# Patient Record
Sex: FEMALE | Race: WHITE | NOT HISPANIC OR LATINO | Employment: FULL TIME | URBAN - METROPOLITAN AREA
[De-identification: names, ages, dates, MRNs, and addresses within clinical notes are randomized per-mention and may not be internally consistent; named-entity substitution may affect disease eponyms.]

---

## 2019-01-17 ENCOUNTER — OFFICE VISIT (OUTPATIENT)
Dept: URGENT CARE | Facility: CLINIC | Age: 39
End: 2019-01-17
Payer: COMMERCIAL

## 2019-01-17 VITALS
DIASTOLIC BLOOD PRESSURE: 82 MMHG | WEIGHT: 197 LBS | TEMPERATURE: 98.3 F | OXYGEN SATURATION: 95 % | HEART RATE: 96 BPM | RESPIRATION RATE: 14 BRPM | HEIGHT: 67 IN | SYSTOLIC BLOOD PRESSURE: 124 MMHG | BODY MASS INDEX: 30.92 KG/M2

## 2019-01-17 DIAGNOSIS — J06.9 VIRAL URI: ICD-10-CM

## 2019-01-17 LAB
INT CON NEG: NORMAL
INT CON POS: NORMAL
S PYO AG THROAT QL: NEGATIVE

## 2019-01-17 PROCEDURE — 87880 STREP A ASSAY W/OPTIC: CPT | Performed by: PHYSICIAN ASSISTANT

## 2019-01-17 PROCEDURE — 99214 OFFICE O/P EST MOD 30 MIN: CPT | Performed by: PHYSICIAN ASSISTANT

## 2019-01-17 RX ORDER — AZITHROMYCIN 250 MG/1
TABLET, FILM COATED ORAL
Qty: 6 TAB | Refills: 0 | Status: SHIPPED | OUTPATIENT
Start: 2019-01-17 | End: 2020-08-24

## 2019-01-17 ASSESSMENT — ENCOUNTER SYMPTOMS
CHILLS: 1
FEVER: 1
COUGH: 1
SENSORY CHANGE: 0
SORE THROAT: 1
PALPITATIONS: 0
VOMITING: 0
SWEATS: 0
HEMOPTYSIS: 0
DIARRHEA: 0
MYALGIAS: 1
FOCAL WEAKNESS: 0
HEADACHES: 1
WHEEZING: 0
TINGLING: 0
ABDOMINAL PAIN: 0
SINUS PAIN: 0
SHORTNESS OF BREATH: 1
RHINORRHEA: 1
NAUSEA: 0
SPUTUM PRODUCTION: 1

## 2019-01-17 NOTE — PROGRESS NOTES
"Subjective:      Edna Cunha is a 38 y.o. female who presents with Pharyngitis            Cough   This is a new problem. Episode onset: 1 week  The problem has been unchanged. The cough is productive of sputum (clear yellow ). Associated symptoms include chills, a fever (100F), headaches, myalgias, nasal congestion, rhinorrhea, a sore throat and shortness of breath. Pertinent negatives include no chest pain, ear congestion, ear pain, hemoptysis, postnasal drip, rash, sweats or wheezing. Treatments tried: Mucinex, Ibuprofen, cough drops. Her past medical history is significant for asthma (mild). There is no history of bronchitis or pneumonia.       Past Medical History:   Diagnosis Date   • Allergy        Past Surgical History:   Procedure Laterality Date   • PECTUS EXCAVATUM         No family history on file.  No Known Allergies  Medications, Allergies, and current problem list reviewed today in Epic      Review of Systems   Constitutional: Positive for chills, fever (100F) and malaise/fatigue.   HENT: Positive for congestion, rhinorrhea and sore throat. Negative for ear discharge, ear pain, postnasal drip and sinus pain.    Respiratory: Positive for cough, sputum production (white,clear) and shortness of breath. Negative for hemoptysis and wheezing.    Cardiovascular: Negative for chest pain, palpitations and leg swelling.   Gastrointestinal: Negative for abdominal pain, diarrhea, nausea and vomiting.   Musculoskeletal: Positive for myalgias.   Skin: Negative for rash.   Neurological: Positive for headaches. Negative for tingling, sensory change and focal weakness.     All other systems reviewed and are negative.        Objective:     /82 (BP Location: Left arm, Patient Position: Sitting, BP Cuff Size: Adult)   Pulse 96   Temp 36.8 °C (98.3 °F) (Temporal)   Resp 14   Ht 1.702 m (5' 7\")   Wt 89.4 kg (197 lb)   SpO2 95%   BMI 30.85 kg/m²      Physical Exam   Constitutional: She is oriented to " person, place, and time. She appears well-developed and well-nourished. No distress.   HENT:   Head: Normocephalic and atraumatic.   Right Ear: Tympanic membrane, external ear and ear canal normal.   Left Ear: Tympanic membrane, external ear and ear canal normal.   Nose: Mucosal edema and rhinorrhea present.   Mouth/Throat: Uvula is midline, oropharynx is clear and moist and mucous membranes are normal.   Eyes: Conjunctivae are normal.   Neck: Neck supple.   Cardiovascular: Normal rate, regular rhythm and normal heart sounds.  Exam reveals no gallop and no friction rub.    No murmur heard.  Pulmonary/Chest: Effort normal and breath sounds normal. No respiratory distress. She has no decreased breath sounds. She has no wheezes. She has no rhonchi. She has no rales.   Lymphadenopathy:     She has no cervical adenopathy.   Neurological: She is alert and oriented to person, place, and time. No cranial nerve deficit.   Skin: Skin is warm and dry. No rash noted.   Psychiatric: She has a normal mood and affect. Her behavior is normal. Judgment and thought content normal.               Assessment/Plan:     1. Viral URI  POCT Rapid Strep A    azithromycin (ZITHROMAX) 250 MG Tab       - POCT Rapid Strep A- negative    Viral etiology discussed.  Encouraged conservative treatment.  Recommend fluids, rest, humidification.    Patient was given a contingent antibiotic prescription to fill and use as directed if symptoms progressed as discussed and agreed upon.       Differential diagnoses, Supportive care, and indications for immediate follow-up discussed with patient.   Instructed to return to clinic or nearest emergency department for any change in condition, further concerns, or worsening of symptoms.    The patient demonstrated a good understanding and agreed with the treatment plan.    Trina Hernandez P.A.-C.

## 2019-01-17 NOTE — LETTER
January 17, 2019         Patient: Edna Cunha   YOB: 1980   Date of Visit: 1/17/2019           To Whom it May Concern:    Edna Cunha was seen in my clinic on 1/17/2019. She is excused from work from 1/17/19-1/18/19 for medical illness.    If you have any questions or concerns, please don't hesitate to call.        Sincerely,           Trina Hernandez P.A.-C.  Electronically Signed

## 2019-06-14 ENCOUNTER — OFFICE VISIT (OUTPATIENT)
Dept: URGENT CARE | Facility: CLINIC | Age: 39
End: 2019-06-14
Payer: COMMERCIAL

## 2019-06-14 ENCOUNTER — HOSPITAL ENCOUNTER (OUTPATIENT)
Facility: MEDICAL CENTER | Age: 39
End: 2019-06-14
Attending: EMERGENCY MEDICINE
Payer: COMMERCIAL

## 2019-06-14 VITALS
DIASTOLIC BLOOD PRESSURE: 86 MMHG | TEMPERATURE: 98.1 F | RESPIRATION RATE: 16 BRPM | WEIGHT: 199 LBS | HEIGHT: 67 IN | HEART RATE: 90 BPM | OXYGEN SATURATION: 95 % | SYSTOLIC BLOOD PRESSURE: 122 MMHG | BODY MASS INDEX: 31.23 KG/M2

## 2019-06-14 DIAGNOSIS — J98.01 BRONCHOSPASM: ICD-10-CM

## 2019-06-14 DIAGNOSIS — J02.9 SORE THROAT: ICD-10-CM

## 2019-06-14 DIAGNOSIS — J06.9 VIRAL UPPER RESPIRATORY TRACT INFECTION: ICD-10-CM

## 2019-06-14 LAB
INT CON NEG: NEGATIVE
INT CON POS: POSITIVE
S PYO AG THROAT QL: NEGATIVE

## 2019-06-14 PROCEDURE — 87070 CULTURE OTHR SPECIMN AEROBIC: CPT

## 2019-06-14 PROCEDURE — 87077 CULTURE AEROBIC IDENTIFY: CPT

## 2019-06-14 PROCEDURE — 87880 STREP A ASSAY W/OPTIC: CPT | Performed by: EMERGENCY MEDICINE

## 2019-06-14 PROCEDURE — 99214 OFFICE O/P EST MOD 30 MIN: CPT | Performed by: EMERGENCY MEDICINE

## 2019-06-14 ASSESSMENT — ENCOUNTER SYMPTOMS
NAUSEA: 0
ROS GI COMMENTS: NOTES GAGGING, VOMITING ASSOCIATED WITH COUGHING EPISODES.
SORE THROAT: 1
RHINORRHEA: 0
SHORTNESS OF BREATH: 1
DIAPHORESIS: 1
CHILLS: 1
DIARRHEA: 0
VOMITING: 1
SPUTUM PRODUCTION: 0
HEMOPTYSIS: 0
COUGH: 1
SINUS PAIN: 0
WHEEZING: 0

## 2019-06-14 NOTE — PROGRESS NOTES
Subjective:      Edna Cunha is a 39 y.o. female who presents with URI (cough,chills,clamness,congestion,sore throat,fatigue-x6 days)            URI    This is a new problem. The current episode started in the past 7 days. The problem has been unchanged. Maximum temperature: initially. Associated symptoms include congestion, coughing, a plugged ear sensation, a sore throat and vomiting. Pertinent negatives include no diarrhea, ear pain, nausea, rash, rhinorrhea, sinus pain or wheezing. She has tried sleep for the symptoms.   PMH AR, RAD, Sjogren's    Review of Systems   Constitutional: Positive for chills and diaphoresis.   HENT: Positive for congestion and sore throat. Negative for ear discharge, ear pain, hearing loss, rhinorrhea and sinus pain.    Respiratory: Positive for cough and shortness of breath. Negative for hemoptysis, sputum production and wheezing.    Gastrointestinal: Positive for vomiting. Negative for diarrhea and nausea.        Notes gagging, vomiting associated with coughing episodes.   Skin: Negative for rash.   Endo/Heme/Allergies: Positive for environmental allergies.     PMH:  has a past medical history of Allergy.  MEDS:   Current Outpatient Prescriptions:   •  Vortioxetine HBr (TRINTELLIX) 20 MG Tab, Take 20 mg by mouth 1 time daily as needed., Disp: , Rfl:   •  azithromycin (ZITHROMAX) 250 MG Tab, Take as directed on package. 1 pack., Disp: 6 Tab, Rfl: 0  •  Loratadine (CLARITIN PO), Take  by mouth., Disp: , Rfl:   •  amoxicillin-clavulanate (AUGMENTIN) 875-125 MG Tab, Take 1 Tab by mouth 2 times a day., Disp: 20 Tab, Rfl: 0  •  azithromycin (ZITHROMAX) 250 MG Tab, Use JOSSIE as directed, Disp: 6 Tab, Rfl: 0  •  cetirizine (ZYRTEC) 10 MG TABS, Take 10 mg by mouth every day., Disp: , Rfl:   •  IBUPROFEN, by Does not apply route., Disp: , Rfl:   ALLERGIES: No Known Allergies  SURGHX:   Past Surgical History:   Procedure Laterality Date   • PECTUS EXCAVATUM       SOCHX:  reports that she  "has never smoked. She has never used smokeless tobacco.  FH: family history is not on file.     Objective:     /86 (BP Location: Left arm, Patient Position: Sitting)   Pulse 90   Temp 36.7 °C (98.1 °F) (Temporal)   Resp 16   Ht 1.702 m (5' 7\")   Wt 90.3 kg (199 lb)   SpO2 95%   BMI 31.17 kg/m²      Physical Exam   Constitutional: She is oriented to person, place, and time. She appears well-developed and well-nourished. She is cooperative.  Non-toxic appearance. No distress.   HENT:   Head: Normocephalic.   Right Ear: Tympanic membrane and ear canal normal.   Left Ear: Tympanic membrane and ear canal normal.   Nose: Mucosal edema present. No rhinorrhea.   Mouth/Throat: No oropharyngeal exudate, posterior oropharyngeal edema or posterior oropharyngeal erythema. Tonsils are 1+ on the right. Tonsils are 1+ on the left. No tonsillar exudate.   Eyes: Conjunctivae and lids are normal.   Neck: Trachea normal and phonation normal. Neck supple.   Cardiovascular: Normal rate, regular rhythm and normal heart sounds.    Pulmonary/Chest: Effort normal. She has no wheezes. She has no rhonchi. She has no rales.   Lymphadenopathy:     She has no cervical adenopathy.   Neurological: She is alert and oriented to person, place, and time.   Skin: Skin is warm and dry.   Psychiatric: She has a normal mood and affect.          Advised continuing maintenance therapy for allergic rhinitis as needed.     Assessment/Plan:     1. Viral upper respiratory tract infection  Recommended supportive care measures, including rest, increasing oral fluid intake and use of over-the-counter medications for relief of symptoms.    2. Bronchospasm  Rx ProAir    3. Sore throat  negative- POCT Rapid Strep A  Throat culture sent at patient request    "

## 2019-06-16 LAB
BACTERIA SPEC RESP CULT: ABNORMAL
BACTERIA SPEC RESP CULT: ABNORMAL
SIGNIFICANT IND 70042: ABNORMAL
SITE SITE: ABNORMAL
SOURCE SOURCE: ABNORMAL

## 2019-06-17 ENCOUNTER — TELEPHONE (OUTPATIENT)
Dept: URGENT CARE | Facility: CLINIC | Age: 39
End: 2019-06-17

## 2019-06-17 DIAGNOSIS — A49.2 H. INFLUENZAE INFECTION: ICD-10-CM

## 2019-06-17 RX ORDER — AMOXICILLIN AND CLAVULANATE POTASSIUM 875; 125 MG/1; MG/1
1 TABLET, FILM COATED ORAL 2 TIMES DAILY
Qty: 14 TAB | Refills: 0 | Status: SHIPPED | OUTPATIENT
Start: 2019-06-17 | End: 2019-06-24

## 2019-06-17 NOTE — TELEPHONE ENCOUNTER
Attempted to call patient regarding lab results. Her VM is full as I was not able to leave a VM. I will try and call again later.

## 2019-06-17 NOTE — TELEPHONE ENCOUNTER
Please notify patient of positive throat culture for   Bacteria H.flu.  Can be associated with other infections, so treatment recommended if symptoms not resolved.  Rx sent to pharmacy.

## 2019-06-30 ENCOUNTER — OFFICE VISIT (OUTPATIENT)
Dept: URGENT CARE | Facility: PHYSICIAN GROUP | Age: 39
End: 2019-06-30
Payer: COMMERCIAL

## 2019-06-30 VITALS
BODY MASS INDEX: 29.55 KG/M2 | HEIGHT: 68 IN | OXYGEN SATURATION: 98 % | RESPIRATION RATE: 16 BRPM | SYSTOLIC BLOOD PRESSURE: 118 MMHG | TEMPERATURE: 97.8 F | HEART RATE: 88 BPM | DIASTOLIC BLOOD PRESSURE: 70 MMHG | WEIGHT: 195 LBS

## 2019-06-30 DIAGNOSIS — J02.9 PHARYNGITIS, UNSPECIFIED ETIOLOGY: ICD-10-CM

## 2019-06-30 DIAGNOSIS — R05.3 PERSISTENT COUGH FOR 3 WEEKS OR LONGER: Primary | ICD-10-CM

## 2019-06-30 DIAGNOSIS — Z86.19: ICD-10-CM

## 2019-06-30 PROCEDURE — 99214 OFFICE O/P EST MOD 30 MIN: CPT | Performed by: PHYSICIAN ASSISTANT

## 2019-06-30 RX ORDER — AMOXICILLIN AND CLAVULANATE POTASSIUM 875; 125 MG/1; MG/1
1 TABLET, FILM COATED ORAL 2 TIMES DAILY
Qty: 20 TAB | Refills: 0 | Status: SHIPPED | OUTPATIENT
Start: 2019-06-30 | End: 2020-08-24

## 2019-07-02 ASSESSMENT — ENCOUNTER SYMPTOMS
SWOLLEN GLANDS: 1
SORE THROAT: 1
WHEEZING: 0
SHORTNESS OF BREATH: 0
COUGH: 1
HEADACHES: 1
FEVER: 0
SPUTUM PRODUCTION: 1

## 2019-07-03 NOTE — PROGRESS NOTES
Subjective:      Edna Cunha is a 39 y.o. female who presents with Pharyngitis (tired, x 2 days )    PMH:  has a past medical history of Allergy.  MEDS:   Current Outpatient Prescriptions:   •  amoxicillin-clavulanate (AUGMENTIN) 875-125 MG Tab, Take 1 Tab by mouth 2 times a day., Disp: 20 Tab, Rfl: 0  •  Albuterol Sulfate 108 (90 Base) MCG/ACT AEROSOL POWDER, BREATH ACTIVATED, Inhale 1-2 Puffs by mouth every 6 hours as needed., Disp: 1 Each, Rfl: 0  •  Vortioxetine HBr (TRINTELLIX) 20 MG Tab, Take 20 mg by mouth 1 time daily as needed., Disp: , Rfl:   •  Loratadine (CLARITIN PO), Take  by mouth., Disp: , Rfl:   •  cetirizine (ZYRTEC) 10 MG TABS, Take 10 mg by mouth every day., Disp: , Rfl:   •  IBUPROFEN, by Does not apply route., Disp: , Rfl:   •  azithromycin (ZITHROMAX) 250 MG Tab, Take as directed on package. 1 pack. (Patient not taking: Reported on 6/30/2019), Disp: 6 Tab, Rfl: 0  •  amoxicillin-clavulanate (AUGMENTIN) 875-125 MG Tab, Take 1 Tab by mouth 2 times a day. (Patient not taking: Reported on 6/30/2019), Disp: 20 Tab, Rfl: 0  •  azithromycin (ZITHROMAX) 250 MG Tab, Use JOSSIE as directed (Patient not taking: Reported on 6/30/2019), Disp: 6 Tab, Rfl: 0  ALLERGIES: No Known Allergies  SURGHX:   Past Surgical History:   Procedure Laterality Date   • PECTUS EXCAVATUM       SOCHX:  reports that she has never smoked. She has never used smokeless tobacco.  FH: Reviewed with patient, not pertinent to this visit.           Patient presents with:  Pharyngitis: tired, x 2 days , cough x 3 weeks. Pt was given rx for augmentin which was very helpful but was only given 7 days of medication.  Pt states symptoms returned very quickly as soon as she finished her medication.  Pt feels she would benefit from more antibiotics.  She and her daughter had + throat culture.            Pharyngitis    This is a new problem. The current episode started 1 to 4 weeks ago. The problem has been gradually worsening. Neither side  "of throat is experiencing more pain than the other. The pain is at a severity of 7/10. The pain is moderate. Associated symptoms include coughing, headaches, a plugged ear sensation and swollen glands. Pertinent negatives include no congestion or shortness of breath. She has tried NSAIDs, cool liquids and gargles (abx) for the symptoms. The treatment provided moderate relief.       Review of Systems   Constitutional: Positive for malaise/fatigue. Negative for fever.   HENT: Positive for sore throat. Negative for congestion.    Respiratory: Positive for cough and sputum production. Negative for shortness of breath and wheezing.    Neurological: Positive for headaches.   All other systems reviewed and are negative.         Objective:     /70   Pulse 88   Temp 36.6 °C (97.8 °F) (Temporal)   Resp 16   Ht 1.727 m (5' 8\")   Wt 88.5 kg (195 lb)   SpO2 98%   BMI 29.65 kg/m²      Physical Exam   Constitutional: She is oriented to person, place, and time. She appears well-developed and well-nourished. No distress.   HENT:   Head: Normocephalic and atraumatic.   Right Ear: Tympanic membrane normal.   Left Ear: Tympanic membrane normal.   Nose: Nose normal.   Mouth/Throat: Posterior oropharyngeal erythema present.   Eyes: Pupils are equal, round, and reactive to light. Conjunctivae and EOM are normal.   Neck: Normal range of motion. Neck supple.   Cardiovascular: Normal rate, regular rhythm and normal heart sounds.    Pulmonary/Chest: Effort normal and breath sounds normal.   Productive cough.    Abdominal: Soft.   Musculoskeletal: Normal range of motion.   Lymphadenopathy:     She has no cervical adenopathy.   Neurological: She is alert and oriented to person, place, and time. Gait normal.   Skin: Skin is warm and dry. Capillary refill takes less than 2 seconds.   Psychiatric: She has a normal mood and affect.   Nursing note and vitals reviewed.              Assessment/Plan:     1. Persistent cough for 3 weeks or " longer  amoxicillin-clavulanate (AUGMENTIN) 875-125 MG Tab   2. Pharyngitis, unspecified etiology  amoxicillin-clavulanate (AUGMENTIN) 875-125 MG Tab   3. History of infection due to Haemophilus influenzae type B  amoxicillin-clavulanate (AUGMENTIN) 875-125 MG Tab     Will extend pt antibiotics as she was feeling much improved until her antibiotics ran out after 7 days.      PT should follow up with PCP in 1-2 days for re-evaluation if symptoms have not improved.  Discussed red flags and reasons to return to UC or ED.  Pt and/or family verbalized understanding of diagnosis and follow up instructions and was offered informational handout on diagnosis.  PT discharged.

## 2020-08-24 ENCOUNTER — PRE-ADMISSION TESTING (OUTPATIENT)
Dept: ADMISSIONS | Facility: MEDICAL CENTER | Age: 40
End: 2020-08-24
Attending: ORTHOPAEDIC SURGERY
Payer: COMMERCIAL

## 2020-08-24 DIAGNOSIS — Z01.812 PRE-OPERATIVE LABORATORY EXAMINATION: ICD-10-CM

## 2020-08-24 LAB
COVID ORDER STATUS COVID19: NORMAL
SARS-COV-2 RNA RESP QL NAA+PROBE: NOTDETECTED
SPECIMEN SOURCE: NORMAL

## 2020-08-24 PROCEDURE — U0003 INFECTIOUS AGENT DETECTION BY NUCLEIC ACID (DNA OR RNA); SEVERE ACUTE RESPIRATORY SYNDROME CORONAVIRUS 2 (SARS-COV-2) (CORONAVIRUS DISEASE [COVID-19]), AMPLIFIED PROBE TECHNIQUE, MAKING USE OF HIGH THROUGHPUT TECHNOLOGIES AS DESCRIBED BY CMS-2020-01-R: HCPCS

## 2020-08-24 RX ORDER — ESCITALOPRAM OXALATE 20 MG/1
20 TABLET ORAL
COMMUNITY
End: 2021-02-22

## 2020-08-24 RX ORDER — IBUPROFEN 200 MG
400 TABLET ORAL EVERY 6 HOURS PRN
COMMUNITY
End: 2021-02-22

## 2020-08-24 SDOH — HEALTH STABILITY: MENTAL HEALTH: HOW OFTEN DO YOU HAVE A DRINK CONTAINING ALCOHOL?: NEVER

## 2020-08-26 ENCOUNTER — ANESTHESIA EVENT (OUTPATIENT)
Dept: SURGERY | Facility: MEDICAL CENTER | Age: 40
End: 2020-08-26
Payer: COMMERCIAL

## 2020-08-26 NOTE — OR NURSING
COVID-19 Pre-surgery screenin. Do you have an undiagnosed respiratory illness or symptoms such as coughing or sneezing?  No        2. Do you have an unexplained fever greater than 100.4 degrees Fahrenheit or 38 degrees Celsius?     No    3. Have you had direct exposure to a patient who tested positive for Covid-19?    No    4. Have you had any loss of your sense of taste or smell? Have you had N/V or sore throat? No    Patient has been informed of visitor policy and asked to wear a mask upon entering the hospital   Yes

## 2020-08-27 ENCOUNTER — ANESTHESIA (OUTPATIENT)
Dept: SURGERY | Facility: MEDICAL CENTER | Age: 40
End: 2020-08-27
Payer: COMMERCIAL

## 2020-08-27 ENCOUNTER — HOSPITAL ENCOUNTER (OUTPATIENT)
Facility: MEDICAL CENTER | Age: 40
End: 2020-08-27
Attending: ORTHOPAEDIC SURGERY | Admitting: ORTHOPAEDIC SURGERY
Payer: COMMERCIAL

## 2020-08-27 VITALS
BODY MASS INDEX: 31.35 KG/M2 | OXYGEN SATURATION: 96 % | WEIGHT: 199.74 LBS | TEMPERATURE: 97.5 F | HEIGHT: 67 IN | DIASTOLIC BLOOD PRESSURE: 81 MMHG | RESPIRATION RATE: 14 BRPM | SYSTOLIC BLOOD PRESSURE: 136 MMHG | HEART RATE: 66 BPM

## 2020-08-27 LAB
HCG UR QL: NEGATIVE
PATHOLOGY CONSULT NOTE: NORMAL

## 2020-08-27 PROCEDURE — 700102 HCHG RX REV CODE 250 W/ 637 OVERRIDE(OP)

## 2020-08-27 PROCEDURE — 160025 RECOVERY II MINUTES (STATS): Performed by: ORTHOPAEDIC SURGERY

## 2020-08-27 PROCEDURE — 88304 TISSUE EXAM BY PATHOLOGIST: CPT

## 2020-08-27 PROCEDURE — 500865 HCHG NEEDLE, SPINAL 20G/22G: Performed by: ORTHOPAEDIC SURGERY

## 2020-08-27 PROCEDURE — 501838 HCHG SUTURE GENERAL: Performed by: ORTHOPAEDIC SURGERY

## 2020-08-27 PROCEDURE — 160039 HCHG SURGERY MINUTES - EA ADDL 1 MIN LEVEL 3: Performed by: ORTHOPAEDIC SURGERY

## 2020-08-27 PROCEDURE — 160002 HCHG RECOVERY MINUTES (STAT): Performed by: ORTHOPAEDIC SURGERY

## 2020-08-27 PROCEDURE — 700101 HCHG RX REV CODE 250: Performed by: ORTHOPAEDIC SURGERY

## 2020-08-27 PROCEDURE — 700111 HCHG RX REV CODE 636 W/ 250 OVERRIDE (IP): Performed by: STUDENT IN AN ORGANIZED HEALTH CARE EDUCATION/TRAINING PROGRAM

## 2020-08-27 PROCEDURE — 700101 HCHG RX REV CODE 250: Performed by: STUDENT IN AN ORGANIZED HEALTH CARE EDUCATION/TRAINING PROGRAM

## 2020-08-27 PROCEDURE — 81025 URINE PREGNANCY TEST: CPT

## 2020-08-27 PROCEDURE — 160035 HCHG PACU - 1ST 60 MINS PHASE I: Performed by: ORTHOPAEDIC SURGERY

## 2020-08-27 PROCEDURE — 160046 HCHG PACU - 1ST 60 MINS PHASE II: Performed by: ORTHOPAEDIC SURGERY

## 2020-08-27 PROCEDURE — 700111 HCHG RX REV CODE 636 W/ 250 OVERRIDE (IP): Performed by: ORTHOPAEDIC SURGERY

## 2020-08-27 PROCEDURE — 700105 HCHG RX REV CODE 258: Performed by: ORTHOPAEDIC SURGERY

## 2020-08-27 PROCEDURE — 160009 HCHG ANES TIME/MIN: Performed by: ORTHOPAEDIC SURGERY

## 2020-08-27 PROCEDURE — 160028 HCHG SURGERY MINUTES - 1ST 30 MINS LEVEL 3: Performed by: ORTHOPAEDIC SURGERY

## 2020-08-27 PROCEDURE — 160048 HCHG OR STATISTICAL LEVEL 1-5: Performed by: ORTHOPAEDIC SURGERY

## 2020-08-27 PROCEDURE — A9270 NON-COVERED ITEM OR SERVICE: HCPCS

## 2020-08-27 RX ORDER — LIDOCAINE HYDROCHLORIDE 10 MG/ML
INJECTION, SOLUTION INFILTRATION; PERINEURAL
Status: DISCONTINUED
Start: 2020-08-27 | End: 2020-08-27 | Stop reason: HOSPADM

## 2020-08-27 RX ORDER — DIPHENHYDRAMINE HYDROCHLORIDE 50 MG/ML
12.5 INJECTION INTRAMUSCULAR; INTRAVENOUS
Status: DISCONTINUED | OUTPATIENT
Start: 2020-08-27 | End: 2020-08-27 | Stop reason: HOSPADM

## 2020-08-27 RX ORDER — ONDANSETRON 2 MG/ML
4 INJECTION INTRAMUSCULAR; INTRAVENOUS
Status: DISCONTINUED | OUTPATIENT
Start: 2020-08-27 | End: 2020-08-27 | Stop reason: HOSPADM

## 2020-08-27 RX ORDER — OXYCODONE HCL 5 MG/5 ML
5 SOLUTION, ORAL ORAL
Status: DISCONTINUED | OUTPATIENT
Start: 2020-08-27 | End: 2020-08-27 | Stop reason: HOSPADM

## 2020-08-27 RX ORDER — OXYCODONE HCL 5 MG/5 ML
10 SOLUTION, ORAL ORAL
Status: DISCONTINUED | OUTPATIENT
Start: 2020-08-27 | End: 2020-08-27 | Stop reason: HOSPADM

## 2020-08-27 RX ORDER — LIDOCAINE HYDROCHLORIDE 20 MG/ML
INJECTION, SOLUTION EPIDURAL; INFILTRATION; INTRACAUDAL; PERINEURAL PRN
Status: DISCONTINUED | OUTPATIENT
Start: 2020-08-27 | End: 2020-08-27 | Stop reason: SURG

## 2020-08-27 RX ORDER — BUPIVACAINE HYDROCHLORIDE 5 MG/ML
INJECTION, SOLUTION EPIDURAL; INTRACAUDAL
Status: DISCONTINUED
Start: 2020-08-27 | End: 2020-08-27 | Stop reason: HOSPADM

## 2020-08-27 RX ORDER — METHYLPREDNISOLONE ACETATE 80 MG/ML
INJECTION, SUSPENSION INTRA-ARTICULAR; INTRALESIONAL; INTRAMUSCULAR; SOFT TISSUE
Status: DISCONTINUED
Start: 2020-08-27 | End: 2020-08-27 | Stop reason: HOSPADM

## 2020-08-27 RX ORDER — BUPIVACAINE HYDROCHLORIDE 5 MG/ML
INJECTION, SOLUTION EPIDURAL; INTRACAUDAL
Status: DISCONTINUED | OUTPATIENT
Start: 2020-08-27 | End: 2020-08-27 | Stop reason: HOSPADM

## 2020-08-27 RX ORDER — CEFAZOLIN SODIUM 1 G/3ML
INJECTION, POWDER, FOR SOLUTION INTRAMUSCULAR; INTRAVENOUS PRN
Status: DISCONTINUED | OUTPATIENT
Start: 2020-08-27 | End: 2020-08-27 | Stop reason: HOSPADM

## 2020-08-27 RX ORDER — HYDROMORPHONE HYDROCHLORIDE 1 MG/ML
0.1 INJECTION, SOLUTION INTRAMUSCULAR; INTRAVENOUS; SUBCUTANEOUS
Status: DISCONTINUED | OUTPATIENT
Start: 2020-08-27 | End: 2020-08-27 | Stop reason: HOSPADM

## 2020-08-27 RX ORDER — HALOPERIDOL 5 MG/ML
1 INJECTION INTRAMUSCULAR
Status: DISCONTINUED | OUTPATIENT
Start: 2020-08-27 | End: 2020-08-27 | Stop reason: HOSPADM

## 2020-08-27 RX ORDER — DEXAMETHASONE SODIUM PHOSPHATE 4 MG/ML
INJECTION, SOLUTION INTRA-ARTICULAR; INTRALESIONAL; INTRAMUSCULAR; INTRAVENOUS; SOFT TISSUE
Status: DISCONTINUED
Start: 2020-08-27 | End: 2020-08-27 | Stop reason: HOSPADM

## 2020-08-27 RX ORDER — LIDOCAINE HYDROCHLORIDE 20 MG/ML
JELLY TOPICAL PRN
Status: DISCONTINUED | OUTPATIENT
Start: 2020-08-27 | End: 2020-08-27 | Stop reason: SURG

## 2020-08-27 RX ORDER — SODIUM CHLORIDE, SODIUM LACTATE, POTASSIUM CHLORIDE, CALCIUM CHLORIDE 600; 310; 30; 20 MG/100ML; MG/100ML; MG/100ML; MG/100ML
INJECTION, SOLUTION INTRAVENOUS CONTINUOUS
Status: DISCONTINUED | OUTPATIENT
Start: 2020-08-27 | End: 2020-08-27 | Stop reason: HOSPADM

## 2020-08-27 RX ORDER — HYDROMORPHONE HYDROCHLORIDE 1 MG/ML
0.2 INJECTION, SOLUTION INTRAMUSCULAR; INTRAVENOUS; SUBCUTANEOUS
Status: DISCONTINUED | OUTPATIENT
Start: 2020-08-27 | End: 2020-08-27 | Stop reason: HOSPADM

## 2020-08-27 RX ORDER — ONDANSETRON 2 MG/ML
INJECTION INTRAMUSCULAR; INTRAVENOUS PRN
Status: DISCONTINUED | OUTPATIENT
Start: 2020-08-27 | End: 2020-08-27 | Stop reason: SURG

## 2020-08-27 RX ORDER — HYDROMORPHONE HYDROCHLORIDE 1 MG/ML
0.4 INJECTION, SOLUTION INTRAMUSCULAR; INTRAVENOUS; SUBCUTANEOUS
Status: DISCONTINUED | OUTPATIENT
Start: 2020-08-27 | End: 2020-08-27 | Stop reason: HOSPADM

## 2020-08-27 RX ORDER — LIDOCAINE HYDROCHLORIDE 10 MG/ML
INJECTION, SOLUTION INFILTRATION; PERINEURAL
Status: DISCONTINUED | OUTPATIENT
Start: 2020-08-27 | End: 2020-08-27 | Stop reason: HOSPADM

## 2020-08-27 RX ORDER — DEXAMETHASONE SODIUM PHOSPHATE 4 MG/ML
INJECTION, SOLUTION INTRA-ARTICULAR; INTRALESIONAL; INTRAMUSCULAR; INTRAVENOUS; SOFT TISSUE PRN
Status: DISCONTINUED | OUTPATIENT
Start: 2020-08-27 | End: 2020-08-27 | Stop reason: SURG

## 2020-08-27 RX ORDER — DEXAMETHASONE SODIUM PHOSPHATE 4 MG/ML
INJECTION, SOLUTION INTRA-ARTICULAR; INTRALESIONAL; INTRAMUSCULAR; INTRAVENOUS; SOFT TISSUE
Status: DISCONTINUED | OUTPATIENT
Start: 2020-08-27 | End: 2020-08-27 | Stop reason: HOSPADM

## 2020-08-27 RX ADMIN — POVIDONE-IODINE 15 ML: 10 SOLUTION TOPICAL at 09:00

## 2020-08-27 RX ADMIN — FENTANYL CITRATE 25 MCG: 50 INJECTION INTRAMUSCULAR; INTRAVENOUS at 10:02

## 2020-08-27 RX ADMIN — ONDANSETRON 4 MG: 2 INJECTION INTRAMUSCULAR; INTRAVENOUS at 09:59

## 2020-08-27 RX ADMIN — CEFAZOLIN 2 G: 330 INJECTION, POWDER, FOR SOLUTION INTRAMUSCULAR; INTRAVENOUS at 10:02

## 2020-08-27 RX ADMIN — PROPOFOL 200 MG: 10 INJECTION, EMULSION INTRAVENOUS at 09:59

## 2020-08-27 RX ADMIN — SODIUM CHLORIDE, POTASSIUM CHLORIDE, SODIUM LACTATE AND CALCIUM CHLORIDE: 600; 310; 30; 20 INJECTION, SOLUTION INTRAVENOUS at 09:15

## 2020-08-27 RX ADMIN — DEXAMETHASONE SODIUM PHOSPHATE 8 MG: 4 INJECTION, SOLUTION INTRA-ARTICULAR; INTRALESIONAL; INTRAMUSCULAR; INTRAVENOUS; SOFT TISSUE at 09:59

## 2020-08-27 RX ADMIN — LIDOCAINE HYDROCHLORIDE 2 ML: 20 JELLY TOPICAL at 09:59

## 2020-08-27 RX ADMIN — LIDOCAINE HYDROCHLORIDE 100 MG: 20 INJECTION, SOLUTION EPIDURAL; INFILTRATION; INTRACAUDAL at 09:59

## 2020-08-27 ASSESSMENT — PAIN SCALES - GENERAL: PAIN_LEVEL: 0

## 2020-08-27 NOTE — ANESTHESIA PROCEDURE NOTES
Airway    Date/Time: 8/27/2020 10:00 AM  Performed by: Jose Angel Welch M.D.  Authorized by: Jose Angel Welch M.D.     Location:  OR  Urgency:  Elective  Indications for Airway Management:  Anesthesia      Spontaneous Ventilation: absent    Sedation Level:  Deep  Preoxygenated: Yes    Mask Difficulty Assessment:  0 - not attempted  Final Airway Type:  Supraglottic airway  Final Supraglottic Airway:  Standard LMA    SGA Size:  3  Number of Attempts at Approach:  1

## 2020-08-27 NOTE — OR NURSING
1123 - Pt arrived to Phase II.  Report from HINA Matute.  VSS, denies pain or nausea.  Room air.     1145 - Pt stable to discharge home.  Discharge instructions gone over with  and patient.  Both verbalize understanding.  IV and armband removed, pt got dressed, has all belongings with her.  Pt taken via wheelchair down to car.

## 2020-08-27 NOTE — DISCHARGE INSTR - OTHER INFO
DR. NICOLE'S POST-OPERATIVE INSTRUCTIONS    You have just undergone a sugery by Dr. Nicole in the operating room.  It is our wish that your postoperative recovery be as quick and comfortable as possible.  Please carefully review the following items that are important for your recovery.    After any operation, a certain degree of pain is to be expected. Take Advil (ibuprofen) and Extra Strength Tylenol as first line medications for mild to moderate pain. Taking each one every 6 hours, and staggering them so that you are taking one medicine every 3 hours, is the most effective. Refer to dosing instructions on the bottle, but in general ibuprofen dose is 600-800mg and Tylenol dose is 500-1000mg. For most small procedures, this should be enough to keep you comfortable. Take these medications until your followup visit. You may have been given a small prescription for stronger pain medicine which will help relieve more severe pain.  Pain medicine may make you drowsy so please keep this in mind.  Do not drive while taking pain medicine.      When you go home, please keep your operated arm elevated at all times (above the level of your heart).  If you do this, your swelling will resolve more quickly and your pain will be improve more quickly as well. You may also place an ice pack over your dressing or splint to help with swelling and pain.    It is also very important to keep your fingers moving after most procedures, unless you had a tendon repair or fracture repair in which case you will be in a splint. Keep all of your fingers moving through a full range of motion to prevent stiffness.    For small hand procedures such as carpal tunnel release, trigger finger release, and cyst excision, the dressing that you have on your extremity should remain on for 3-4 days. It may then be removed, you can wash the incision  gently with soap and water, and keep the incision covered with a band-aid or similar clean dressing. For larger procedures or if you have a splint on, these should remain on until follow up or as specifically instructed. If you feel that your dressing is too tight during the first 3 days after surgery, you may loosen it. It is normal to see minor staining on the dressing after surgery. If there is significant bleeding, you are advised to call the office during regular office hours to have this checked.  Make sure that your dressing is kept dry at all times.  You can take a shower if you cover your arm with a plastic bag. If your dressing gets wet, replace it with sterile dressing that you can obtain from your local drug store.    Please call our office for a follow-up appointment, [phone]. Follow up after surgery is typically 10-14 days, unless you were specifically instructed otherwise. The sutures will be removed and you may be asked to see a hand therapist to optimize your functional result. Each of the hand therapists that you will be referred to have received special training in the care of the hand and upper extremity.    If you have questions regarding your surgery postop that you feel requires attention, please call the office at [phone] during business hours, or [phone] after hours for the answering service. If you feel that you have a surgical emergency postoperatively that requires immediate attention, please call the above numbers or go to the Emergency Department and ask for the Orthopedic Surgeon on call.

## 2020-08-27 NOTE — ANESTHESIA PREPROCEDURE EVALUATION
Anes H&P:  PAST MEDICAL HISTORY:   40 y.o. female who presents for Procedure(s) (LRB):  EXCISION, GANGLION CYST- WRIST (Left)  INJECTION, JOINT, DIAGNOSTIC- CORTISONE IN LEFT FIRST DORSAL COMPARTMENT.  She has current and past medical problems significant for:    Past Medical History:   Diagnosis Date   • Allergy    • Asthma    • Heart burn    • Psychiatric problem     depression, anxiety    • Urinary incontinence        SMOKING/ALCOHOL/RECREATIONAL DRUG USE:  Social History     Tobacco Use   • Smoking status: Never Smoker   • Smokeless tobacco: Never Used   Substance Use Topics   • Alcohol use: Never     Frequency: Never   • Drug use: Yes     Types: Inhaled, Oral     Comment: cannabis daily      Social History     Substance and Sexual Activity   Drug Use Yes   • Types: Inhaled, Oral    Comment: cannabis daily        PAST SURGICAL HISTORY:  Past Surgical History:   Procedure Laterality Date   • PECTUS EXCAVATUM         ALLERGIES:   No Known Allergies    MEDICATIONS:  No current facility-administered medications on file prior to encounter.      Current Outpatient Medications on File Prior to Encounter   Medication Sig Dispense Refill   • Albuterol Sulfate 108 (90 Base) MCG/ACT AEROSOL POWDER, BREATH ACTIVATED Inhale 1-2 Puffs by mouth every 6 hours as needed. 1 Each 0   • cetirizine (ZYRTEC) 10 MG TABS Take 10 mg by mouth as needed.         LABS:  Lab Results   Component Value Date/Time    HEMOGLOBIN 13.3 08/23/2008 0935    HEMATOCRIT 38.4 08/23/2008 0935    WBC 17.6 (H) 08/23/2008 0935     No results found for: SODIUM, POTASSIUM, CHLORIDE, CO2, GLUCOSE, BUN, CALCIUM    SARS-CoV2 result: Negative      PREVIOUS ANESTHETICS: See EMR  __________________________________________      Relevant Problems   No relevant active problems       Physical Exam    Airway   Mallampati: I  TM distance: >3 FB  Neck ROM: full       Cardiovascular - normal exam  Rhythm: regular  Rate: normal  (-) murmur     Dental - normal exam            Pulmonary - normal exam     Abdominal    Neurological - normal exam               Anesthesia Plan    ASA 2       Plan - general       Airway plan will be LMA        Induction: intravenous    Postoperative Plan: Postoperative administration of opioids is intended.    Pertinent diagnostic labs and testing reviewed    Informed Consent:    Anesthetic plan and risks discussed with patient.

## 2020-08-27 NOTE — ANESTHESIA POSTPROCEDURE EVALUATION
Patient: Edna Cunha    Procedure Summary     Date: 08/27/20 Room / Location: [facility] ROOM 21 / SURGERY SAME DAY [facility]    Anesthesia Start: 0954 Anesthesia Stop: 1028    Procedures:       EXCISION, GANGLION CYST- WRIST (Left Wrist)      INJECTION, JOINT, DIAGNOSTIC- CORTISONE IN LEFT FIRST DORSAL COMPARTMENT (Left Wrist) Diagnosis: (GANGLION CYST OF LEFT DORSAL WRIST)    Surgeon: Yelena Nicole M.D. Responsible Provider: Jose Angel Welch M.D.    Anesthesia Type: general ASA Status: 2          Final Anesthesia Type: general  Last vitals  BP   Blood Pressure: 105/65    Temp   36.6 °C (97.8 °F)    Pulse   Pulse: (!) 53   Resp   20    SpO2   100 %      Anesthesia Post Evaluation    Patient location during evaluation: PACU  Patient participation: complete - patient participated  Level of consciousness: awake and alert  Pain score: 0    Airway patency: patent  Anesthetic complications: no  Cardiovascular status: hemodynamically stable  Respiratory status: acceptable  Hydration status: euvolemic    PONV: none           Nurse Pain Score: 0 (NPRS)

## 2020-08-27 NOTE — OR NURSING
1027 arrived from or to pacu from Or remains asleep with oral airway in place dressing to left arm cdi good cap refill to all fingers   1034 awake oral airway removed breathing even unlabored   1047 o2 off 95%   1123 vital signs returned to pt baseline more awake alert drinking water well   Qualifies for stage 2 report given pt transferred to pacu 2

## 2020-08-27 NOTE — ANESTHESIA TIME REPORT
Anesthesia Start and Stop Event Times     Date Time Event    8/27/2020 0945 Ready for Procedure     0954 Anesthesia Start     1028 Anesthesia Stop        Responsible Staff  08/27/20    Name Role Begin End    Jose Angel Welch M.D. Anesth 0954 1028        Preop Diagnosis (Free Text):  Pre-op Diagnosis     GANGLION CYST OF LEFT DORSAL WRIST        Preop Diagnosis (Codes):    Post op Diagnosis  Ganglion cyst      Premium Reason  Non-Premium    Comments:

## 2020-08-27 NOTE — OP REPORT
OPERATIVE NOTE     DATE OF PROCEDURE: 8/27/2020            PRE-OP DIAGNOSIS: Left wrist dorsal ganglion cyst, left first dorsal compartment tenosynovitis            POST-OP DIAGNOSIS: same            PROCEDURE: Left wrist dorsal ganglion cyst excision, cortisone injection to left first dorsal compartment            SURGEON: Yelena Nicole M.D. - Primary            ANESTHESIA: General            ESTIMATED BLOOD LOSS: Minimal                   SPECIMENS: 1 specimen            COMPLICATIONS: None            CONDITION: Stable to PACU            OPERATIVE INDICATIONS AND DESCRIPTION OF PROCEDURE: This is a pleasant 40-year-old female who presented to my office with a dorsal wrist mass.  It was consistent with a dorsal wrist ganglion.  It was causing discomfort with use of the wrist and they failed conservative management.  She also had de Quervain's tenosynovitis on the same wrist.  We discussed options, both conservative and operative.  We discussed cyst excision as well as a cortisone injection to the first dorsal compartment at the same time.  Risks including, but not limited to, bleeding, infection, recurrence of the cyst, stiffness of the wrist, continued pain in the wrist, risk of anesthesia, were discussed.  They elected to proceed.  The patient was seen in the preoperative holding area, identified, and the wrist was marked.  They were taken back to the operating room.  General anesthesia was induced by the anesthesia provider.  A tourniquet was placed on the arm and the upper extremity was prepped and draped in usual orthopedic fashion.  A timeout was performed. I first injected a mixture of kenalog and 1% lidocaine into the first dorsal compartment. An additional time out was performed. The tourniquet was inflated to 250mmHg.  A 3 to 4 cm longitudinal incision was made over the dorsal wrist directly overlying the cyst.  I bluntly dissected through the subcutaneous tissues.  I encountered the ganglion cyst.  I  bluntly dissected around the cyst.  The cyst was passing through the fourth dorsal compartment. I made a small opening in the fourth compartment, retracted the extensor tendons to either side to protect them, and continued to dissect bluntly around the cyst.  I found the stalk which was tracking down towards the wrist capsule.  I opened the wrist capsule around the stalk of the mass.  It appeared to be coming from the scapholunate interval.  I amputated the stalk from its origin and sent the entire specimen to pathology. I made sure to identify and protect the SL ligament. The mass was removed in its entirety including the entire stalk and a small amount of capsule at the base of the stalk.  I thoroughly irrigated the wound with normal saline.  The skin was closed with 4-0 nylon suture.  A sterile dressing and a volar wrist splint were applied.  The patient woke from anesthesia and was transferred to PACU in stable condition.

## 2020-08-27 NOTE — DISCHARGE INSTRUCTIONS
ACTIVITY: Rest and take it easy for the first 24 hours.  A responsible adult is recommended to remain with you during that time.  It is normal to feel sleepy.  We encourage you to not do anything that requires balance, judgment or coordination.    MILD FLU-LIKE SYMPTOMS ARE NORMAL. YOU MAY EXPERIENCE GENERALIZED MUSCLE ACHES, THROAT IRRITATION, HEADACHE AND/OR SOME NAUSEA.    FOR 24 HOURS DO NOT:  Drive, operate machinery or run household appliances.  Drink beer or alcoholic beverages.   Make important decisions or sign legal documents.    SPECIAL INSTRUCTIONS: see Dr. Nicole's instructions    DIET: To avoid nausea, slowly advance diet as tolerated, avoiding spicy or greasy foods for the first day.  Add more substantial food to your diet according to your physician's instructions.  Babies can be fed formula or breast milk as soon as they are hungry.  INCREASE FLUIDS AND FIBER TO AVOID CONSTIPATION.    SURGICAL DRESSING/BATHING: see Dr. Nicole's instructions    FOLLOW-UP APPOINTMENT:  A follow-up appointment should be arranged with your doctor; call to schedule.    You should CALL YOUR PHYSICIAN if you develop:  Fever greater than 101 degrees F.  Pain not relieved by medication, or persistent nausea or vomiting.  Excessive bleeding (blood soaking through dressing) or unexpected drainage from the wound.  Extreme redness or swelling around the incision site, drainage of pus or foul smelling drainage.  Inability to urinate or empty your bladder within 8 hours.  Problems with breathing or chest pain.    You should call [phone] if you develop problems with breathing or chest pain.  If you are unable to contact your doctor or surgical center, you should go to the nearest emergency room or urgent care center.  Physician's telephone #: [phone]    If any questions arise, call your doctor.  If your doctor is not available, please feel free to call the Surgical Center at [phone].  The Center is open Monday through  Friday from 7AM to 7PM.  You can also call the HEALTH HOTLINE open 24 hours/day, 7 days/week and speak to a nurse at [phone], or toll free at [phone].    A registered nurse may call you a few days after your surgery to see how you are doing after your procedure.    MEDICATIONS: Resume taking daily medication.  Take prescribed pain medication with food.  If no medication is prescribed, you may take non-aspirin pain medication if needed.  PAIN MEDICATION CAN BE VERY CONSTIPATING.  Take a stool softener or laxative such as senokot, pericolace, or milk of magnesia if needed.    Prescription given for oxycodone.     If your physician has prescribed pain medication that includes Acetaminophen (Tylenol), do not take additional Acetaminophen (Tylenol) while taking the prescribed medication.    Depression / Suicide Risk    As you are discharged from this [facility] facility, it is important to learn how to keep safe from harming yourself.    Recognize the warning signs:  · Abrupt changes in personality, positive or negative- including increase in energy   · Giving away possessions  · Change in eating patterns- significant weight changes-  positive or negative  · Change in sleeping patterns- unable to sleep or sleeping all the time   · Unwillingness or inability to communicate  · Depression  · Unusual sadness, discouragement and loneliness  · Talk of wanting to die  · Neglect of personal appearance   · Rebelliousness- reckless behavior  · Withdrawal from people/activities they love  · Confusion- inability to concentrate     If you or a loved one observes any of these behaviors or has concerns about self-harm, here's what you can do:  · Talk about it- your feelings and reasons for harming yourself  · Remove any means that you might use to hurt yourself (examples: pills, rope, extension cords, firearm)  · Get professional help from the community (Mental Health, Substance Abuse, psychological counseling)  · Do  not be alone:Call your Safe Contact- someone whom you trust who will be there for you.  · Call your local CRISIS HOTLINE 338-6987 or 261-868-6587  · Call your local Children's Mobile Crisis Response Team [place] (294) 471-2958 or www.[employer] [place]  · Call the toll free National Suicide Prevention Hotlines   · National Suicide Prevention Lifeline 178-118-KOVO (6042)  · National [employer] Network 800-SUICIDE (188-0425)

## 2021-02-16 ENCOUNTER — HOSPITAL ENCOUNTER (OUTPATIENT)
Facility: MEDICAL CENTER | Age: 41
End: 2021-02-16
Attending: PHYSICIAN ASSISTANT
Payer: COMMERCIAL

## 2021-02-16 ENCOUNTER — OFFICE VISIT (OUTPATIENT)
Dept: URGENT CARE | Facility: CLINIC | Age: 41
End: 2021-02-16
Payer: COMMERCIAL

## 2021-02-16 ENCOUNTER — APPOINTMENT (OUTPATIENT)
Dept: RADIOLOGY | Facility: IMAGING CENTER | Age: 41
End: 2021-02-16
Attending: PHYSICIAN ASSISTANT
Payer: COMMERCIAL

## 2021-02-16 VITALS
TEMPERATURE: 98 F | SYSTOLIC BLOOD PRESSURE: 124 MMHG | DIASTOLIC BLOOD PRESSURE: 82 MMHG | RESPIRATION RATE: 18 BRPM | BODY MASS INDEX: 30.8 KG/M2 | WEIGHT: 203.2 LBS | HEIGHT: 68 IN | OXYGEN SATURATION: 92 % | HEART RATE: 105 BPM

## 2021-02-16 DIAGNOSIS — R06.03 ACUTE RESPIRATORY DISTRESS: ICD-10-CM

## 2021-02-16 DIAGNOSIS — R51.9 NONINTRACTABLE HEADACHE, UNSPECIFIED CHRONICITY PATTERN, UNSPECIFIED HEADACHE TYPE: ICD-10-CM

## 2021-02-16 LAB — D DIMER PPP IA.FEU-MCNC: 0.4 UG/ML (FEU) (ref 0–0.5)

## 2021-02-16 PROCEDURE — 99215 OFFICE O/P EST HI 40 MIN: CPT | Performed by: PHYSICIAN ASSISTANT

## 2021-02-16 PROCEDURE — 71046 X-RAY EXAM CHEST 2 VIEWS: CPT | Mod: TC | Performed by: PHYSICIAN ASSISTANT

## 2021-02-16 PROCEDURE — 85379 FIBRIN DEGRADATION QUANT: CPT

## 2021-02-16 RX ORDER — SERTRALINE HYDROCHLORIDE 100 MG/1
100 TABLET, FILM COATED ORAL DAILY
COMMUNITY

## 2021-02-16 ASSESSMENT — ENCOUNTER SYMPTOMS
SPUTUM PRODUCTION: 0
HEADACHES: 1
WHEEZING: 0
ABDOMINAL PAIN: 0
DIARRHEA: 0
HEMOPTYSIS: 0
NAUSEA: 0
SHORTNESS OF BREATH: 1
DIZZINESS: 0
SEIZURES: 0
FOCAL WEAKNESS: 0
PALPITATIONS: 0
VOMITING: 0
TINGLING: 0
COUGH: 0
SORE THROAT: 0
CHILLS: 0
LOSS OF CONSCIOUSNESS: 0
FEVER: 0

## 2021-02-17 ENCOUNTER — HOSPITAL ENCOUNTER (OUTPATIENT)
Facility: MEDICAL CENTER | Age: 41
End: 2021-02-17
Attending: NURSE PRACTITIONER
Payer: COMMERCIAL

## 2021-02-17 ENCOUNTER — OFFICE VISIT (OUTPATIENT)
Dept: URGENT CARE | Facility: CLINIC | Age: 41
End: 2021-02-17
Payer: COMMERCIAL

## 2021-02-17 VITALS
OXYGEN SATURATION: 97 % | HEART RATE: 112 BPM | RESPIRATION RATE: 18 BRPM | SYSTOLIC BLOOD PRESSURE: 132 MMHG | TEMPERATURE: 97.1 F | DIASTOLIC BLOOD PRESSURE: 80 MMHG

## 2021-02-17 DIAGNOSIS — R53.83 FATIGUE, UNSPECIFIED TYPE: ICD-10-CM

## 2021-02-17 DIAGNOSIS — R06.02 SOB (SHORTNESS OF BREATH): ICD-10-CM

## 2021-02-17 PROCEDURE — U0005 INFEC AGEN DETEC AMPLI PROBE: HCPCS

## 2021-02-17 PROCEDURE — U0003 INFECTIOUS AGENT DETECTION BY NUCLEIC ACID (DNA OR RNA); SEVERE ACUTE RESPIRATORY SYNDROME CORONAVIRUS 2 (SARS-COV-2) (CORONAVIRUS DISEASE [COVID-19]), AMPLIFIED PROBE TECHNIQUE, MAKING USE OF HIGH THROUGHPUT TECHNOLOGIES AS DESCRIBED BY CMS-2020-01-R: HCPCS

## 2021-02-17 PROCEDURE — 99214 OFFICE O/P EST MOD 30 MIN: CPT | Performed by: NURSE PRACTITIONER

## 2021-02-17 ASSESSMENT — ENCOUNTER SYMPTOMS
SORE THROAT: 0
HEADACHES: 0
SHORTNESS OF BREATH: 0
POLYDIPSIA: 0
COUGH: 0
FEVER: 0
TINGLING: 0
WEIGHT LOSS: 0
CHILLS: 0
PALPITATIONS: 0
BACK PAIN: 0
FOCAL WEAKNESS: 0
SENSORY CHANGE: 0
ORTHOPNEA: 0
MYALGIAS: 0
NAUSEA: 0
DIZZINESS: 0

## 2021-02-17 ASSESSMENT — LIFESTYLE VARIABLES: SUBSTANCE_ABUSE: 0

## 2021-02-17 NOTE — PROGRESS NOTES
Subjective:   Edna Cunha  is a 40 y.o. female who presents for Headache (x 2 days having headache, fatigue, sob )      Headache   This is a new problem. The current episode started yesterday. Pertinent negatives include no abdominal pain, coughing, dizziness, ear pain, fever, nausea, seizures, sore throat, tingling or vomiting.   Patient comes to clinic with a multitude of complaints involving recent fatigue waxing and waning over time and concerns for thyroid function as well as intermittent headaches.  She notes more concerningly over the last 2 days she has had sensation of shortness of breath.  She describes feeling a sensation of rapid pulse and need to rest well doing simple things like a sending a flight of stairs.  She denies chest pain or palpitations.  She denies history of clotting or bleeding disorders.  She denies swelling to legs.  She complains of appreciation of recent achy discomfort to bilateral calves and thighs.  She denies history of DVT or PE.  She denies cough stridor or wheeze but notes increased shortness of breath on exertion.  She denies treatments tried.  She has been attempting to establish with primary care but has had difficult time with scheduling.    Review of Systems   Constitutional: Positive for malaise/fatigue ( longer term). Negative for chills and fever.   HENT: Positive for congestion ( mild). Negative for ear pain and sore throat.    Respiratory: Positive for shortness of breath. Negative for cough, hemoptysis, sputum production and wheezing.    Cardiovascular: Negative for chest pain, palpitations and leg swelling.   Gastrointestinal: Negative for abdominal pain, diarrhea, nausea and vomiting.   Skin: Negative for rash.   Neurological: Positive for headaches. Negative for dizziness, tingling, focal weakness, seizures and loss of consciousness.       No Known Allergies     Objective:   /82 (BP Location: Left arm, Patient Position: Sitting, BP Cuff Size:  "Large adult)   Pulse (!) 105   Temp 36.7 °C (98 °F) (Temporal)   Resp 18   Ht 1.727 m (5' 8\")   Wt 92.2 kg (203 lb 3.2 oz)   SpO2 92%   BMI 30.90 kg/m²     Physical Exam  Vitals and nursing note reviewed.   Constitutional:       General: She is not in acute distress.     Appearance: She is well-developed. She is not ill-appearing, toxic-appearing or diaphoretic.   HENT:      Head: Normocephalic and atraumatic.      Right Ear: Tympanic membrane, ear canal and external ear normal.      Left Ear: Tympanic membrane, ear canal and external ear normal.      Nose: Congestion present.      Mouth/Throat:      Pharynx: Uvula midline. Posterior oropharyngeal erythema ( mild PND) present. No oropharyngeal exudate.      Tonsils: No tonsillar abscesses.   Eyes:      General: Lids are normal. No scleral icterus.        Right eye: No discharge.         Left eye: No discharge.      Conjunctiva/sclera: Conjunctivae normal.   Cardiovascular:      Rate and Rhythm: Normal rate and regular rhythm.   Pulmonary:      Effort: Pulmonary effort is normal. No respiratory distress.      Breath sounds: Normal breath sounds. No stridor. No decreased breath sounds, wheezing, rhonchi or rales.      Comments: Speaks easily in full sentences, no cough  Chest:      Chest wall: No tenderness.   Musculoskeletal:         General: Normal range of motion.      Cervical back: Neck supple.   Lymphadenopathy:      Cervical: Cervical adenopathy ( mild bilat) present.   Skin:     General: Skin is warm and dry.      Coloration: Skin is not pale.      Findings: No erythema.   Neurological:      Mental Status: She is alert and oriented to person, place, and time. She is not disoriented.   Psychiatric:         Speech: Speech normal.         Behavior: Behavior normal.     CXR -   2/16/2021 4:50 PM     HISTORY/REASON FOR EXAM:  Shortness of Breath.        TECHNIQUE/EXAM DESCRIPTION AND NUMBER OF VIEWS:  Two views of the chest.     COMPARISON:  " None.     FINDINGS:  The heart is normal in size.  No pulmonary infiltrates or consolidations are noted.  No pleural effusions are appreciated.        IMPRESSION:     No active disease.             Last Resulted: 02/16/21  5:11 PM            ECG - EKG in the office reveals a normal sinus rhythm with a rate of 71. There is no ectopy, no ST elevation, depression, no signs of ischemia or infarct.      D DIMER - NEGATIVE; call back number [phone]  Patient informed via [employer] negative D-dimer result    Assessment/Plan:   1. Acute respiratory distress  - DX-CHEST-2 VIEWS; Future  - D-DIMER; Future  - EKG  - REFERRAL TO FAMILY PRACTICE    2. Nonintractable headache, unspecified chronicity pattern, unspecified headache type  - REFERRAL TO FAMILY PRACTICE    Other orders  - sertraline (ZOLOFT) 100 MG Tab; Take 100 mg by mouth every day.  Supportive care is reviewed with patient/caregiver - recommend to push PO fluids and electrolytes, discussed negative work-up EKG chest x-ray and D-dimer, nontoxic appearance the patient-with worsening recommendation for ER evaluation  Follow-up with primary care for annual [id number] placed today  Return to clinic with lack of resolution or progression of symptoms.  ER precautions with any worsening symptoms are reviewed with patient/caregiver and they do express understanding    I have worn an N95 mask, gloves and eye protection for the entire encounter with this patient.     Differential diagnosis, natural history, supportive care, and indications for immediate follow-up discussed.    My total time spent caring for the patient on the day of the encounter was 40 minutes.   This does not include time spent on separately billable procedures/tests.

## 2021-02-17 NOTE — PROGRESS NOTES
Edna Cunha is a 40 y.o. female who presents for Fatigue (follow up ), Shortness of Breath, and Sweats      HPI this a new problem.  Edna is a 40-year-old female presents with fatigue, shortness of breath, sweats and concerned about her thyroid.  She was seen in urgent care yesterday for the same complaints.  She is concerned that she has Hashimoto's disease.  She recently had labs done by her psychiatrist but does not know the results.  She knows that her thyroid was tested.  Her psychiatrist told her that she had low vitamin D and that she should supplement.  She has been taking a multivitamin for a few days now.  Her shortness of breath occurs with exertion.  She reports that even climbing a flight of stairs makes her have to stop and rest.  She spoke with her family member who is an RN who told her that she should have her blood checked for anemia.  She is here today requesting follow-up on her thyroid and possible anemia.  She does not have a primary care provider at this time.  No other aggravating or alleviating factors.    Review of Systems   Constitutional: Positive for malaise/fatigue. Negative for chills, fever and weight loss.        Dry hair, dry skin  No change in voice   HENT: Negative for congestion and sore throat.         She believes her thyroid is enlarged   Respiratory: Negative for cough and shortness of breath.    Cardiovascular: Negative for chest pain, palpitations, orthopnea and leg swelling.   Gastrointestinal: Negative for nausea.   Musculoskeletal: Negative for back pain and myalgias.   Neurological: Negative for dizziness, tingling, sensory change, focal weakness and headaches.   Endo/Heme/Allergies: Negative for environmental allergies and polydipsia.   Psychiatric/Behavioral: Negative for substance abuse.       No Known Allergies  Past Medical History:   Diagnosis Date   • Allergy    • Asthma    • Heart burn    • Psychiatric problem     depression, anxiety    • Urinary  incontinence      Past Surgical History:   Procedure Laterality Date   • GANGLION EXCISION Left 8/27/2020    Procedure: EXCISION, GANGLION CYST- WRIST;  Surgeon: Yelena Nicole M.D.;  Location: SURGERY SAME DAY [facility];  Service: Orthopedics   • JOINT INJECTION DIAGNOSTIC Left 8/27/2020    Procedure: INJECTION, JOINT, DIAGNOSTIC- CORTISONE IN LEFT FIRST DORSAL COMPARTMENT;  Surgeon: Yelena Nicole M.D.;  Location: SURGERY SAME DAY [facility];  Service: Orthopedics   • PECTUS EXCAVATUM       History reviewed. No pertinent family history.  Social History     Tobacco Use   • Smoking status: Never Smoker   • Smokeless tobacco: Never Used   Substance Use Topics   • Alcohol use: Never     There is no problem list on file for this patient.    Current Outpatient Medications on File Prior to Visit   Medication Sig Dispense Refill   • sertraline (ZOLOFT) 100 MG Tab Take 100 mg by mouth every day.     • ibuprofen (MOTRIN) 200 MG Tab Take 400 mg by mouth every 6 hours as needed.     • escitalopram (LEXAPRO) 20 MG tablet Take 20 mg by mouth every bedtime.     • Multiple Vitamins-Minerals (MULTIVITAMIN ADULTS PO) Take 6 Tabs by mouth ONE-HALF HOUR AFTER LUNCH.     • Albuterol Sulfate 108 (90 Base) MCG/ACT AEROSOL POWDER, BREATH ACTIVATED Inhale 1-2 Puffs by mouth every 6 hours as needed. 1 Each 0   • cetirizine (ZYRTEC) 10 MG TABS Take 10 mg by mouth as needed.       No current facility-administered medications on file prior to visit.          Objective:     /80 (BP Location: Left arm, Patient Position: Sitting, BP Cuff Size: Adult)   Pulse (!) 112   Temp 36.2 °C (97.1 °F) (Temporal)   Resp 18   SpO2 97%     Physical Exam  Vitals and nursing note reviewed.   Constitutional:       General: She is not in acute distress.     Appearance: Normal appearance. She is well-developed. She is not ill-appearing or toxic-appearing.   HENT:      Head: Normocephalic.      Comments: Normal full-thickness hair.     Right Ear:  Hearing, ear canal and external ear normal. No middle ear effusion. Tympanic membrane is injected. Tympanic membrane is not erythematous.      Left Ear: Hearing, ear canal and external ear normal.  No middle ear effusion. Tympanic membrane is injected. Tympanic membrane is not erythematous.      Nose: Nose normal. No mucosal edema, congestion or rhinorrhea.      Right Sinus: No maxillary sinus tenderness or frontal sinus tenderness.      Left Sinus: No maxillary sinus tenderness or frontal sinus tenderness.      Mouth/Throat:      Mouth: Mucous membranes are moist.      Pharynx: Uvula midline. No oropharyngeal exudate or posterior oropharyngeal erythema.      Tonsils: No tonsillar abscesses.   Eyes:      Conjunctiva/sclera: Conjunctivae normal.      Pupils: Pupils are equal, round, and reactive to light.   Neck:      Thyroid: No thyroid mass, thyromegaly or thyroid tenderness.      Trachea: Trachea and phonation normal. No tracheal tenderness or tracheal deviation.   Cardiovascular:      Rate and Rhythm: Normal rate and regular rhythm.      Chest Wall: PMI is not displaced.   Pulmonary:      Effort: Pulmonary effort is normal. No respiratory distress.      Breath sounds: Normal breath sounds. No decreased breath sounds, wheezing, rhonchi or rales.   Abdominal:      Palpations: Abdomen is soft.      Tenderness: There is no abdominal tenderness.   Musculoskeletal:         General: Normal range of motion.      Cervical back: Full passive range of motion without pain, normal range of motion and neck supple. No pain with movement. Normal range of motion.   Lymphadenopathy:      Cervical: No cervical adenopathy.      Right cervical: No superficial cervical adenopathy.     Left cervical: No superficial cervical adenopathy.      Upper Body:      Right upper body: No supraclavicular adenopathy.      Left upper body: No supraclavicular adenopathy.   Skin:     General: Skin is warm and dry.      Capillary Refill: Capillary  refill takes less than 2 seconds.   Neurological:      Mental Status: She is alert and oriented to person, place, and time.      Gait: Gait normal.   Psychiatric:         Mood and Affect: Mood normal.         Speech: Speech normal.         Behavior: Behavior normal.         Thought Content: Thought content normal.         Judgment: Judgment normal.     D-dimer: Normal done yesterday  Chest x-ray: Done yesterday which was normal      Assessment /Associated Orders:      1. Fatigue, unspecified type  REFERRAL TO FOLLOW-UP WITH PRIMARY CARE    SARS-CoV-2 PCR (24 hour In-House): Collect NP swab in VTM    CBC WITH DIFFERENTIAL   2. SOB (shortness of breath)  CBC WITH DIFFERENTIAL       Medical Decision Making:    Pt is clinically stable at today's acute urgent care visit.  No acute distress noted. Appropriate for outpatient management at this time.   CBC-pending  COVID PCR testing - pending- (results to be released to [facility] if available)   Self Quarantine per CDC guidelines  Educated in infection control practices.   Discussed that this illness was most likely viral in nature. Did not see any evidence of a bacterial process.  I do not see any evidence of acute anemia or acute hypo or hyperthyroidism at this time.  She has thyroid test pending with her psychiatrist.  I do not think that is necessary to repeat those today.  Referral placed to primary care provider to establish care  Walking 1 minute SPO2 97% with a heart rate of 112.  Patient has a perceived dyspnea with exertion  Keep well hydrated  Increase rest    Advised the patient to follow-up with the primary care physician for recheck, reevaluation, and consideration of further management if necessary.   Discussed management options (risks,benefits, and alternatives to treatment). Pt expresses understanding and the treatment plan was agreed upon. Questions were encouraged and answered to pt's satisfaction.   Return to urgent care prn if new or worsening sx or if  there is no improvement in condition prn . Red flags discussed and indications to immediately call 911 or present to the Emergency Department.     I personally reviewed prior external notes and test results pertinent to today's visit.  I have independently reviewed and interpreted all diagnostics ordered during this urgent care acute visit.   Time spent evaluating this patient was at least 30 minutes and includes preparing for visit, counseling/education, exam and evaluation, obtaining history, independent interpretation, ordering lab/test/procedures and medication management.          Please note that this dictation was created using voice recognition software. I have made a reasonable attempt to correct obvious errors, but I expect that there are errors of grammar and possibly content that I did not discover before finalizing the note.    This note was electronically signed by Grecia CASTELAN, Urgent Care

## 2021-02-18 DIAGNOSIS — R53.83 FATIGUE, UNSPECIFIED TYPE: ICD-10-CM

## 2021-02-19 ENCOUNTER — TELEPHONE (OUTPATIENT)
Dept: SCHEDULING | Facility: IMAGING CENTER | Age: 41
End: 2021-02-19

## 2021-02-22 ENCOUNTER — OFFICE VISIT (OUTPATIENT)
Dept: MEDICAL GROUP | Facility: PHYSICIAN GROUP | Age: 41
End: 2021-02-22
Payer: COMMERCIAL

## 2021-02-22 VITALS
RESPIRATION RATE: 14 BRPM | HEIGHT: 68 IN | HEART RATE: 85 BPM | WEIGHT: 206 LBS | BODY MASS INDEX: 31.22 KG/M2 | TEMPERATURE: 98.2 F | SYSTOLIC BLOOD PRESSURE: 128 MMHG | OXYGEN SATURATION: 100 % | DIASTOLIC BLOOD PRESSURE: 70 MMHG

## 2021-02-22 DIAGNOSIS — F41.9 ANXIETY AND DEPRESSION: ICD-10-CM

## 2021-02-22 DIAGNOSIS — M35.00 SJOGREN'S SYNDROME, WITH UNSPECIFIED ORGAN INVOLVEMENT (HCC): ICD-10-CM

## 2021-02-22 DIAGNOSIS — J45.20 MILD INTERMITTENT ASTHMA WITHOUT COMPLICATION: ICD-10-CM

## 2021-02-22 DIAGNOSIS — E66.09 CLASS 1 OBESITY DUE TO EXCESS CALORIES WITHOUT SERIOUS COMORBIDITY WITH BODY MASS INDEX (BMI) OF 31.0 TO 31.9 IN ADULT: ICD-10-CM

## 2021-02-22 DIAGNOSIS — J30.2 SEASONAL ALLERGIES: ICD-10-CM

## 2021-02-22 DIAGNOSIS — R53.83 OTHER FATIGUE: ICD-10-CM

## 2021-02-22 DIAGNOSIS — F32.A ANXIETY AND DEPRESSION: ICD-10-CM

## 2021-02-22 DIAGNOSIS — G43.009 MIGRAINE WITHOUT AURA AND WITHOUT STATUS MIGRAINOSUS, NOT INTRACTABLE: ICD-10-CM

## 2021-02-22 DIAGNOSIS — R06.02 SHORTNESS OF BREATH: ICD-10-CM

## 2021-02-22 DIAGNOSIS — E78.49 OTHER HYPERLIPIDEMIA: ICD-10-CM

## 2021-02-22 PROBLEM — E66.811 CLASS 1 OBESITY DUE TO EXCESS CALORIES WITHOUT SERIOUS COMORBIDITY WITH BODY MASS INDEX (BMI) OF 31.0 TO 31.9 IN ADULT: Status: ACTIVE | Noted: 2021-02-22

## 2021-02-22 PROCEDURE — 99204 OFFICE O/P NEW MOD 45 MIN: CPT | Performed by: STUDENT IN AN ORGANIZED HEALTH CARE EDUCATION/TRAINING PROGRAM

## 2021-02-22 ASSESSMENT — PATIENT HEALTH QUESTIONNAIRE - PHQ9
CLINICAL INTERPRETATION OF PHQ2 SCORE: 5
SUM OF ALL RESPONSES TO PHQ QUESTIONS 1-9: 18
5. POOR APPETITE OR OVEREATING: 2 - MORE THAN HALF THE DAYS

## 2021-02-22 ASSESSMENT — ANXIETY QUESTIONNAIRES
7. FEELING AFRAID AS IF SOMETHING AWFUL MIGHT HAPPEN: MORE THAN HALF THE DAYS
5. BEING SO RESTLESS THAT IT IS HARD TO SIT STILL: SEVERAL DAYS
GAD7 TOTAL SCORE: 17
4. TROUBLE RELAXING: NEARLY EVERY DAY
2. NOT BEING ABLE TO STOP OR CONTROL WORRYING: MORE THAN HALF THE DAYS
3. WORRYING TOO MUCH ABOUT DIFFERENT THINGS: NEARLY EVERY DAY
6. BECOMING EASILY ANNOYED OR IRRITABLE: NEARLY EVERY DAY
1. FEELING NERVOUS, ANXIOUS, OR ON EDGE: NEARLY EVERY DAY

## 2021-02-22 NOTE — ASSESSMENT & PLAN NOTE
Patient is obese, with BMI of 31.  This was discussed with the patient, and she notes that it has increased recently, with all the stress she has been under, and pandemic lockdown.  Mai diet and exercise, but patient was reluctant to follow-up on planning for this, as she is currently trying to find an underlying medical reason for her symptoms, and does not wish to diet, until those are identified.

## 2021-02-22 NOTE — ASSESSMENT & PLAN NOTE
Notes unilateral (mostly right) migraines in association with the start of her menstrual cycle.  She notes she can control the pain with occasional ibuprofen.  Otherwise, usually not problematic.  ...  Accompanied by photophobia (no phonophobia). No aura or visual changes.

## 2021-02-22 NOTE — ASSESSMENT & PLAN NOTE
Patient sees Dr. Lyon, psychiatry.  And also has a therapist as well (every other week).  Psychiatry had recently ordered a number of labs, to evaluate for underlying medical conditions; however, most of these labs were in the normal ranges.  Patient notes having recently changed medications quite a few times, and states she is on her third different medication, trying to get the correct improvement.      SHIV-7 Questionnaire  Feeling nervous, anxious, or on edge: Nearly every day  Not being able to sop or control worrying: More than half the days  Worrying too much about different things: Nearly every day  Trouble relaxing: Nearly every day  Being so restless that it's hard to sit still: Several days  Becoming easily annoyed or irritable: Nearly every day  Feeling afraid as if something awful might happen: More than half the days  Total: 17  Interpretation of SHIV 7 Total Score   Score Severity :  0-4 No Anxiety   5-9 Mild Anxiety  10-14 Moderate Anxiety  15-21 Severe Anxiety    Depression Screening  Little interest or pleasure in doing things?  3 - nearly every day   Feeling down, depressed , or hopeless? 2 - more than half the days   Trouble falling or staying asleep, or sleeping too much?  2 - more than half the days   Feeling tired or having little energy?  3 - nearly every day   Poor appetite or overeating?  2 - more than half the days   Feeling bad about yourself - or that you are a failure or have let yourself or your family down? 3 - nearly every day   Trouble concentrating on things, such as reading the newspaper or watching television? 2 - more than half the days   Moving or speaking so slowly that other people could have noticed.  Or the opposite - being so fidgety or restless that you have been moving around a lot more than usual?  0 - not at all   Thoughts that you would be better off dead, or of hurting yourself?  1 - several days   Patient Health Questionnaire Score: 18   If depressive symptoms  identified deferred to follow up visit unless specifically addressed in assesment and plan.  Interpretation of PHQ-9 Total Score   Score Severity   1-4 No Depression   5-9 Mild Depression   10-14 Moderate Depression   15-19 Moderately Severe Depression   20-27 Severe Depression

## 2021-02-22 NOTE — ASSESSMENT & PLAN NOTE
Patient has outside labs, noting hyperlipidemia.  Lipids - 216, 135, 37, 154.  This was discussed with the patient, and advised on diet and exercise.  However, at this time, the patient declines either, for the time being.

## 2021-02-22 NOTE — ASSESSMENT & PLAN NOTE
Patient notes history of asthma, since childhood.  Notes its more often bothers her during allergies.  She notes only having to use albuterol once or twice a month.  And, she was never hospitalized for it.

## 2021-02-22 NOTE — ASSESSMENT & PLAN NOTE
Patient notes occasional seasonal/environmental allergies.  She uses occasional Zyrtec to control this.  She only uses it as needed.  She does not view this is a major issue currently.

## 2021-02-22 NOTE — LETTER
[facility]  Kvng Valles M.D.  [address]  [place] [address]  Fax: [phone]   Authorization for Release/Disclosure of   Protected Health Information   Name: NALLELY ADKINS : 1980 SSN: [id number]   Address: [address] [place] [address] [address]  [place] [address] Phone:    [phone] (home)    I authorize the entity listed below to release/disclose the PHI below to:   [facility]/Kvng Valles M.D. and Kvng Valles M.D.   Provider or Entity Name:   Dr. Barksdale.    Address   City, State, [facility] Phone:      Fax:     Reason for request: continuity of care   Information to be released:    [  ] LAST COLONOSCOPY,  including any PATH REPORT and follow-up  [  ] LAST FIT/COLOGUARD RESULT [  ] LAST DEXA  [  ] LAST MAMMOGRAM  [  ] LAST PAP  [  ] LAST LABS [  ] RETINA EXAM REPORT  [  ] IMMUNIZATION RECORDS  [x] Release all info (past 4 years).       [  ] Check here and initial the line next to each item to release ALL health information INCLUDING  _____ Care and treatment for drug and / or alcohol abuse  _____ HIV testing, infection status, or AIDS  _____ Genetic Testing    DATES OF SERVICE OR TIME PERIOD TO BE DISCLOSED: __last 4 years_______  I understand and acknowledge that:  * This Authorization may be revoked at any time by you in writing, except if your health information has already been used or disclosed.  * Your health information that will be used or disclosed as a result of you signing this authorization could be re-disclosed by the recipient. If this occurs, your re-disclosed health information may no longer be protected by State or Federal laws.  * You may refuse to sign this Authorization. Your refusal will not affect your ability to obtain treatment.  * This Authorization becomes effective upon signing and will  on (date) __________.      If no date is indicated, this Authorization will  one (1) year from the signature date.    Name: Nallely Dupont  Alph[place]    Signature:   Date:     2/22/2021       PLEASE FAX REQUESTED RECORDS BACK TO: [phone]

## 2021-02-22 NOTE — PROGRESS NOTES
"New to Provider (and [facility])      Reason to establish: New patient to establish  Chief Complaint   Patient presents with   • Establish Care     lab review   • Fatigue     SOB, anx, dep        HPI:   Edna Cunha is a 40 y.o. female.     Patient is here to establish, and independently reviewed some labs, that previously been ordered by her psychiatrist.  She is concerned about shortness of breath and fatigue, and focusing a great deal on potential for thyroid issues.    Other fatigue  Patient notes ongoing fatigue and feeling \"rundown\", for the past 6 months.  She has also noted some body aches, and is concerned about possible thyroid issues.  She does have history of anxiety and depression, and scored high for both of these issues on screening today.  However, she denies this is related to it.  However, she is very focused on trying to work-up the thyroid the on the regular work-up.  She also notes occasional episodes of shortness of breath (usually with increased heart rate and anxiety), but states that it is different than her \"normal level of anxiety\".    Anxiety and depression  Patient sees Dr. Lyon, psychiatry.  And also has a therapist as well (every other week).  Psychiatry had recently ordered a number of labs, to evaluate for underlying medical conditions; however, most of these labs were in the normal ranges.  Patient notes having recently changed medications quite a few times, and states she is on her third different medication, trying to get the correct improvement.      SIHV-7 Questionnaire  Feeling nervous, anxious, or on edge: Nearly every day  Not being able to sop or control worrying: More than half the days  Worrying too much about different things: Nearly every day  Trouble relaxing: Nearly every day  Being so restless that it's hard to sit still: Several days  Becoming easily annoyed or irritable: Nearly every day  Feeling afraid as if something awful might happen: " More than half the days  Total: 17  Interpretation of SHIV 7 Total Score   Score Severity :  0-4 No Anxiety   5-9 Mild Anxiety  10-14 Moderate Anxiety  15-21 Severe Anxiety    Depression Screening  Little interest or pleasure in doing things?  3 - nearly every day   Feeling down, depressed , or hopeless? 2 - more than half the days   Trouble falling or staying asleep, or sleeping too much?  2 - more than half the days   Feeling tired or having little energy?  3 - nearly every day   Poor appetite or overeating?  2 - more than half the days   Feeling bad about yourself - or that you are a failure or have let yourself or your family down? 3 - nearly every day   Trouble concentrating on things, such as reading the newspaper or watching television? 2 - more than half the days   Moving or speaking so slowly that other people could have noticed.  Or the opposite - being so fidgety or restless that you have been moving around a lot more than usual?  0 - not at all   Thoughts that you would be better off dead, or of hurting yourself?  1 - several days   Patient Health Questionnaire Score: 18   If depressive symptoms identified deferred to follow up visit unless specifically addressed in assesment and plan.  Interpretation of PHQ-9 Total Score   Score Severity   1-4 No Depression   5-9 Mild Depression   10-14 Moderate Depression   15-19 Moderately Severe Depression   20-27 Severe Depression      Mild intermittent asthma without complication  Patient notes history of asthma, since childhood.  Notes its more often bothers her during allergies.  She notes only having to use albuterol once or twice a month.  And, she was never hospitalized for it.    Seasonal allergies  Patient notes occasional seasonal/environmental allergies.  She uses occasional Zyrtec to control this.  She only uses it as needed.  She does not view this is a major issue currently.     Migraine without aura and without status migrainosus, not intractable  Notes  unilateral (mostly right) migraines in association with the start of her menstrual cycle.  She notes she can control the pain with occasional ibuprofen.  Otherwise, usually not problematic.  ...  Accompanied by photophobia (no phonophobia). No aura or visual changes.     Class 1 obesity due to excess calories without serious comorbidity with body mass index (BMI) of 31.0 to 31.9 in adult  Patient is obese, with BMI of 31.  This was discussed with the patient, and she notes that it has increased recently, with all the stress she has been under, and pandemic lockdown.  Mai diet and exercise, but patient was reluctant to follow-up on planning for this, as she is currently trying to find an underlying medical reason for her symptoms, and does not wish to diet, until those are identified.       Review of Symptoms  GEN/CONST:   Denies fever, chills, weakness      + fatigue.   + mild weight gain. (less active, and eating more).   H/E:     Denies blurry vision or eye pain.  ENT:   Denies sinus pain, sore throat, ear pain, difficulty hearing.  CARDIO:   Denies chest pain, orthopnea, or edema.       + Increased heart rate, and shortness of breath, episodically.  RESP:   Denies wheezing, or coughing.     + Increased heart rate, and shortness of breath, episodically.  GI:    Denies nausea, vomiting,  constipation, or abdominal pain.   :   Denies dysuria, hematuria,   HEME:  Denies easy bruising, bleeding   MSK:  Denies weakness,     + some general muscle aches.       NEURO:  Denies numbness, or focal weakness.      + Notes occasional sensation of feet falling asleep, more than in prior year     + Notes occasional irritation of her wrists, after typing for long periods at work.  ENDO:  Denies polyuria, or polydipsia.  PSYCH:  + see HPI.         Past Medical History:   Diagnosis Date   • Allergy    • Asthma    • Heart burn    • Psychiatric problem     depression, anxiety    • Sjogren's disease (HCC)     SSA+       Past  "Surgical History:   Procedure Laterality Date   • GANGLION EXCISION Left 8/27/2020    Procedure: EXCISION, GANGLION CYST- WRIST;  Surgeon: Yelena Nicole M.D.;  Location: SURGERY SAME DAY [facility];  Service: Orthopedics   • JOINT INJECTION DIAGNOSTIC Left 8/27/2020    Procedure: INJECTION, JOINT, DIAGNOSTIC- CORTISONE IN LEFT FIRST DORSAL COMPARTMENT;  Surgeon: Yelena Nicole M.D.;  Location: SURGERY SAME DAY [facility];  Service: Orthopedics   • PECTUS EXCAVATUM         No family history on file.    Social History     Tobacco Use   • Smoking status: Never Smoker   • Smokeless tobacco: Never Used   Substance Use Topics   • Alcohol use: Not Currently     Comment: few times per year       Current Outpatient Medications   Medication Sig Dispense Refill   • sertraline (ZOLOFT) 100 MG Tab Take 100 mg by mouth every day.     • Multiple Vitamins-Minerals (MULTIVITAMIN ADULTS PO) Take 6 Tabs by mouth ONE-HALF HOUR AFTER LUNCH.     • Albuterol Sulfate 108 (90 Base) MCG/ACT AEROSOL POWDER, BREATH ACTIVATED Inhale 1-2 Puffs by mouth every 6 hours as needed. 1 Each 0   • cetirizine (ZYRTEC) 10 MG TABS Take 10 mg by mouth as needed.       No current facility-administered medications for this visit.       Allergies as of 02/22/2021   • (No Known Allergies)         Physical Exam  Heart rate and oxygenation were repeated.  Initially, had 95% O2, with .  However, when pulse ox was repeated, and talking to the patient (as distraction), pulse ox was noted to be 100%, and heart rate normal (85).   ...  As the patient continued to talk, it appeared she was not pausing to breathe, and the pulse ox slowly decreased to 95 again.  However, once she paused her rapid speaking, and inhaled, the pulse ox again returned up to the high 90s.  ........  /70 (BP Location: Right arm, Patient Position: Sitting, BP Cuff Size: Large adult)   Pulse 85   Temp 36.8 °C (98.2 °F) (Temporal)   Resp 14   Ht 1.727 m (5' 8\")   Wt 93.4 kg " (206 lb)   LMP 02/03/2021 (Exact Date)   SpO2 100%   Breastfeeding No   BMI 31.32 kg/m²   General:  Alert and oriented, No apparent distress.     + overweight / obese.   Eyes:  PER   EOMI.  No scleral icterus.      Throat: Clear, no erythema or exudates noted.     + Mallampati score: 3.   Neck: Supple. No lymphadenopathy noted.      - Thyroid not palpable; no obvious nodules.    Lungs: Clear to auscultation bilaterally.  No wheezes or rales.  Cardiovascular: Regular rate and rhythm.  No murmurs, rubs or gallops.  Abdomen:  Soft.  Non-distended.  Non-tender.     Extremities: No pedal edema.  Good general motion of extremities.    Neurological:  Motor function intact and symmetrical.   and sensation grossly intact and symmetrical to light touch   Psychological: + Appears somewhat anxious, and hyper-focused on obtaining additional medical testing, especially for thyroid.  + Appears reluctant to discuss even the possibility of some of the symptoms possibly being related to anxiety or depression.        Labs:  Recent / Prior labs reviewed, and independently interpreted.     CBC, CMP, Mag, Lipids, TSH, fT4, Vit.D, B12, Folate  NEIL, SSA-Ab.   ... Patient brought in outside labs, there were none in our system yet.  These were independently interpreted and reviewed with patient.    Normal :  cbc, cmp, mag,  b12, folate, fT4 (1.18), TSH (2.2).  Lipids : had  high chol, and ldl (with slightly low ldl)  Vit.D : 29.8.    ... Prior / older labs, noted + NEIL (speckled pattern), 1:1280.  + SSA-Ab's (> 8.0).            Assessment & Plan    1. Other fatigue  Patient notes fatigue, for the past 6 months, and scored very high for anxiety and depression, it may simply be related to her secondary to these issues.  However, will try to work-up further, medically.  She brings in labs, that did not show any underlying issue with CBC, CMP, magnesium, TSH, or free T4.  She also notes that she has been using marijuana, daily, for about 6-9  months.  This was identified and reviewed with the patient, but currently she feels it is unlikely be the cause of her fatigue.  She notes history of snoring, and Mallampati score of 3+, as well as being fatigued and tired; therefore, will refer for sleep study.  In the setting of asthma, and possible history of Sjogren's syndrome, we will also obtain new PFTs.  (And requesting records from rheumatology).  - REFERRAL TO PULMONARY AND SLEEP MEDICINE  - PULMONARY FUNCTION TESTS -Test requested: Complete Pulmonary Function Test; Future    2. Shortness of breath  3. Mild intermittent asthma without complication  4. Sjogren's syndrome, with unspecified organ involvement (HCC)  Patient notes periodic episodes of shortness of breath versus dyspnea.  She does have a history of intermittent asthma, as well as possible Sjogren's and anxiety/depression.  In office, we are able to observe changes in heart rate and oxygenation, depending on patient being relaxed versus talking rapidly (without pausing to inhale).  Physical exam was unremarkable for heart/lung findings.  She had an EKG done, in urgent care, 1 week ago, that was noted to be normal sinus rhythm, and without significant ST changes.    In the setting of asthma, and possible history of Sjogren's syndrome, we will also obtain new PFTs.  (And requesting records from rheumatology).  - PULMONARY FUNCTION TESTS -Test requested: Complete Pulmonary Function Test; Future    5. Anxiety and depression  Patient was noted to have severe anxiety and moderately severe depression, on questionnaires.  She currently follows with Dr. Lyon (psychiatry), as well as a therapist.  This is likely related to some of her current complaints, but evaluating for underlying medical conditions as noted elsewhere.  Psychiatry / psychology, to continue to manage this issue.    6. Seasonal allergies  Patient notes occasional seasonal/environmental allergies, and uses Zyrtec as needed.    7.  Migraine without aura and without status migrainosus, not intractable  Patient notes occasional migraines, around time of her stroke cycle.  She only uses occasional ibuprofen for this.  Will not add additional medications at this time.    8. Class 1 obesity due to excess calories without serious comorbidity with body mass index (BMI) of 31.0 to 31.9 in adult  9. Other hyperlipidemia  Patient has BMI of 31, and hyperlipidemia, from prior labs.  This was discussed with the patient, and we recommended diet and exercise.  However, patient states that she feels her weight gain and lack of exercise are related to some underlying medical condition.  We are evaluating for other conditions as listed above.  - Patient identified as having weight management issue.  Appropriate orders and counseling given.  (However, it appears she may not make changes, in the short-term future.)      Patient will be getting PFTs and sleep study done, to evaluate for her fatigue.  We are requesting outside records from rheumatology.    The patient is to follow-up in 3 months, for further evaluation.  Health maintenance screening can be reviewed further at that time.      A computerized dictation system may have been used in this note.    Despite review, there may be some spelling or grammatical errors.    Kvng Valles M.D.  2/22/2021

## 2021-02-22 NOTE — ASSESSMENT & PLAN NOTE
"Patient notes ongoing fatigue and feeling \"rundown\", for the past 6 months.  She has also noted some body aches, and is concerned about possible thyroid issues.  She does have history of anxiety and depression, and scored high for both of these issues on screening today.  However, she denies this is related to it.  However, she is very focused on trying to work-up the thyroid the on the regular work-up.  She also notes occasional episodes of shortness of breath (usually with increased heart rate and anxiety), but states that it is different than her \"normal level of anxiety\".  "

## 2021-07-18 ENCOUNTER — HOSPITAL ENCOUNTER (EMERGENCY)
Facility: MEDICAL CENTER | Age: 41
End: 2021-07-18
Attending: EMERGENCY MEDICINE
Payer: COMMERCIAL

## 2021-07-18 ENCOUNTER — APPOINTMENT (OUTPATIENT)
Dept: RADIOLOGY | Facility: MEDICAL CENTER | Age: 41
End: 2021-07-18
Attending: EMERGENCY MEDICINE
Payer: COMMERCIAL

## 2021-07-18 VITALS
HEART RATE: 71 BPM | DIASTOLIC BLOOD PRESSURE: 92 MMHG | OXYGEN SATURATION: 95 % | RESPIRATION RATE: 16 BRPM | SYSTOLIC BLOOD PRESSURE: 143 MMHG | HEIGHT: 67 IN | TEMPERATURE: 98.7 F | BODY MASS INDEX: 31.49 KG/M2 | WEIGHT: 200.62 LBS

## 2021-07-18 DIAGNOSIS — K08.89 PAIN, DENTAL: ICD-10-CM

## 2021-07-18 LAB
ALBUMIN SERPL BCP-MCNC: 3.9 G/DL (ref 3.2–4.9)
ALBUMIN/GLOB SERPL: 1.3 G/DL
ALP SERPL-CCNC: 68 U/L (ref 30–99)
ALT SERPL-CCNC: 8 U/L (ref 2–50)
ANION GAP SERPL CALC-SCNC: 9 MMOL/L (ref 7–16)
AST SERPL-CCNC: 10 U/L (ref 12–45)
BASOPHILS # BLD AUTO: 0.4 % (ref 0–1.8)
BASOPHILS # BLD: 0.03 K/UL (ref 0–0.12)
BILIRUB SERPL-MCNC: 0.6 MG/DL (ref 0.1–1.5)
BUN SERPL-MCNC: 6 MG/DL (ref 8–22)
CALCIUM SERPL-MCNC: 9.1 MG/DL (ref 8.4–10.2)
CHLORIDE SERPL-SCNC: 102 MMOL/L (ref 96–112)
CO2 SERPL-SCNC: 25 MMOL/L (ref 20–33)
CREAT SERPL-MCNC: 0.95 MG/DL (ref 0.5–1.4)
EOSINOPHIL # BLD AUTO: 0.11 K/UL (ref 0–0.51)
EOSINOPHIL NFR BLD: 1.3 % (ref 0–6.9)
ERYTHROCYTE [DISTWIDTH] IN BLOOD BY AUTOMATED COUNT: 39.6 FL (ref 35.9–50)
GLOBULIN SER CALC-MCNC: 3.1 G/DL (ref 1.9–3.5)
GLUCOSE SERPL-MCNC: 110 MG/DL (ref 65–99)
HCG SERPL QL: NEGATIVE
HCT VFR BLD AUTO: 42.4 % (ref 37–47)
HGB BLD-MCNC: 14.1 G/DL (ref 12–16)
IMM GRANULOCYTES # BLD AUTO: 0.04 K/UL (ref 0–0.11)
IMM GRANULOCYTES NFR BLD AUTO: 0.5 % (ref 0–0.9)
LYMPHOCYTES # BLD AUTO: 0.72 K/UL (ref 1–4.8)
LYMPHOCYTES NFR BLD: 8.4 % (ref 22–41)
MCH RBC QN AUTO: 30.5 PG (ref 27–33)
MCHC RBC AUTO-ENTMCNC: 33.3 G/DL (ref 33.6–35)
MCV RBC AUTO: 91.6 FL (ref 81.4–97.8)
MONOCYTES # BLD AUTO: 0.42 K/UL (ref 0–0.85)
MONOCYTES NFR BLD AUTO: 4.9 % (ref 0–13.4)
NEUTROPHILS # BLD AUTO: 7.24 K/UL (ref 2–7.15)
NEUTROPHILS NFR BLD: 84.5 % (ref 44–72)
NRBC # BLD AUTO: 0 K/UL
NRBC BLD-RTO: 0 /100 WBC
PLATELET # BLD AUTO: 238 K/UL (ref 164–446)
PMV BLD AUTO: 9.6 FL (ref 9–12.9)
POTASSIUM SERPL-SCNC: 3.7 MMOL/L (ref 3.6–5.5)
PROT SERPL-MCNC: 7 G/DL (ref 6–8.2)
RBC # BLD AUTO: 4.63 M/UL (ref 4.2–5.4)
SODIUM SERPL-SCNC: 136 MMOL/L (ref 135–145)
WBC # BLD AUTO: 8.6 K/UL (ref 4.8–10.8)

## 2021-07-18 PROCEDURE — 84703 CHORIONIC GONADOTROPIN ASSAY: CPT

## 2021-07-18 PROCEDURE — 80053 COMPREHEN METABOLIC PANEL: CPT

## 2021-07-18 PROCEDURE — 96375 TX/PRO/DX INJ NEW DRUG ADDON: CPT

## 2021-07-18 PROCEDURE — 36415 COLL VENOUS BLD VENIPUNCTURE: CPT

## 2021-07-18 PROCEDURE — 700117 HCHG RX CONTRAST REV CODE 255: Performed by: EMERGENCY MEDICINE

## 2021-07-18 PROCEDURE — 96365 THER/PROPH/DIAG IV INF INIT: CPT

## 2021-07-18 PROCEDURE — 700111 HCHG RX REV CODE 636 W/ 250 OVERRIDE (IP): Performed by: EMERGENCY MEDICINE

## 2021-07-18 PROCEDURE — 700105 HCHG RX REV CODE 258: Performed by: EMERGENCY MEDICINE

## 2021-07-18 PROCEDURE — 99284 EMERGENCY DEPT VISIT MOD MDM: CPT

## 2021-07-18 PROCEDURE — 85025 COMPLETE CBC W/AUTO DIFF WBC: CPT

## 2021-07-18 PROCEDURE — 70487 CT MAXILLOFACIAL W/DYE: CPT

## 2021-07-18 RX ORDER — AMOXICILLIN AND CLAVULANATE POTASSIUM 875; 125 MG/1; MG/1
1 TABLET, FILM COATED ORAL 2 TIMES DAILY
Qty: 14 TABLET | Refills: 0 | Status: SHIPPED | OUTPATIENT
Start: 2021-07-18 | End: 2021-07-25

## 2021-07-18 RX ORDER — DEXAMETHASONE SODIUM PHOSPHATE 4 MG/ML
4 INJECTION, SOLUTION INTRA-ARTICULAR; INTRALESIONAL; INTRAMUSCULAR; INTRAVENOUS; SOFT TISSUE ONCE
Status: COMPLETED | OUTPATIENT
Start: 2021-07-18 | End: 2021-07-18

## 2021-07-18 RX ADMIN — SODIUM CHLORIDE 3 G: 900 INJECTION INTRAVENOUS at 12:21

## 2021-07-18 RX ADMIN — IOHEXOL 100 ML: 350 INJECTION, SOLUTION INTRAVENOUS at 12:23

## 2021-07-18 RX ADMIN — DEXAMETHASONE SODIUM PHOSPHATE 4 MG: 4 INJECTION, SOLUTION INTRA-ARTICULAR; INTRALESIONAL; INTRAMUSCULAR; INTRAVENOUS; SOFT TISSUE at 12:21

## 2021-07-18 NOTE — ED TRIAGE NOTES
"Presents complaining of right upper canine tooth pain and swelling, after the loss of a temporary crown, this past Friday.  She is scheduled to see her dentist tomorrow.   Chief Complaint   Patient presents with   • Dental Pain     /97   Pulse 100   Temp 36.7 °C (98 °F) (Temporal)   Resp 20   Ht 1.702 m (5' 7\")   Wt 91 kg (200 lb 9.9 oz)   LMP 07/04/2021 (Approximate)   SpO2 95%   BMI 31.42 kg/m²        "

## 2021-07-18 NOTE — ED NOTES
Discharged to home with instructions and prescription sent to pharmacy. Patient will follow up with her dentist.

## 2021-07-18 NOTE — ED PROVIDER NOTES
ED Provider Note    CHIEF COMPLAINT  Chief Complaint   Patient presents with   • Dental Pain       HPI  Edna Cunha is a 41 y.o. female who presents for evaluation of right-sided upper facial pain.  The patient reports that she has a crown a fall from 48 hours ago and one of her right maxillary lateral incisors.  She scheduled an appointment with her dentist tomorrow.  This morning she woke up with swelling on the right aspect of the face and the upper lip.  No trouble breathing or swallowing no high fevers or chills    REVIEW OF SYSTEMS  See HPI for further details.  No high fevers chills night sweats weight loss all other systems are negative.     PAST MEDICAL HISTORY  Past Medical History:   Diagnosis Date   • Allergy    • Asthma    • Heart burn    • Psychiatric problem     depression, anxiety    • Sjogren's disease (HCC)     SSA+       FAMILY HISTORY  Noncontributory    SOCIAL HISTORY  Social History     Socioeconomic History   • Marital status:      Spouse name: Not on file   • Number of children: Not on file   • Years of education: Not on file   • Highest education level: Not on file   Occupational History   • Not on file   Tobacco Use   • Smoking status: Never Smoker   • Smokeless tobacco: Never Used   Vaping Use   • Vaping Use: Some days   Substance and Sexual Activity   • Alcohol use: Not Currently   • Drug use: Yes     Types: Inhaled, Oral     Comment: Cannabis, daily    • Sexual activity: Not Currently     Comment: , now monogamous relationship   Other Topics Concern   • Not on file   Social History Narrative   • Not on file     Social Determinants of Health     Financial Resource Strain:    • Difficulty of Paying Living Expenses:    Food Insecurity:    • Worried About Running Out of Food in the Last Year:    • Ran Out of Food in the Last Year:    Transportation Needs:    • Lack of Transportation (Medical):    • Lack of Transportation (Non-Medical):    Physical Activity:    •  "Days of Exercise per Week:    • Minutes of Exercise per Session:    Stress:    • Feeling of Stress :    Social Connections:    • Frequency of Communication with Friends and Family:    • Frequency of Social Gatherings with Friends and Family:    • Attends Sabianist Services:    • Active Member of Clubs or Organizations:    • Attends Club or Organization Meetings:    • Marital Status:    Intimate Partner Violence:    • Fear of Current or Ex-Partner:    • Emotionally Abused:    • Physically Abused:    • Sexually Abused:      Denies IV drugs  SURGICAL HISTORY  Past Surgical History:   Procedure Laterality Date   • GANGLION EXCISION Left 8/27/2020    Procedure: EXCISION, GANGLION CYST- WRIST;  Surgeon: Yelena Nicole M.D.;  Location: SURGERY SAME DAY [facility] ORS;  Service: Orthopedics   • JOINT INJECTION DIAGNOSTIC Left 8/27/2020    Procedure: INJECTION, JOINT, DIAGNOSTIC- CORTISONE IN LEFT FIRST DORSAL COMPARTMENT;  Surgeon: Yelena Nicole M.D.;  Location: SURGERY SAME DAY [facility] ORS;  Service: Orthopedics   • PECTUS EXCAVATUM         CURRENT MEDICATIONS  Home Medications    **Home medications have not yet been reviewed for this encounter**     No current facility-administered medications for this encounter.    Current Outpatient Medications:   •  sertraline (ZOLOFT) 100 MG Tab, Take 100 mg by mouth every day., Disp: , Rfl:   •  Multiple Vitamins-Minerals (MULTIVITAMIN ADULTS PO), Take 6 Tabs by mouth ONE-HALF HOUR AFTER LUNCH., Disp: , Rfl:   •  Albuterol Sulfate 108 (90 Base) MCG/ACT AEROSOL POWDER, BREATH ACTIVATED, Inhale 1-2 Puffs by mouth every 6 hours as needed., Disp: 1 Each, Rfl: 0  •  cetirizine (ZYRTEC) 10 MG TABS, Take 10 mg by mouth as needed., Disp: , Rfl:       ALLERGIES  No Known Allergies    PHYSICAL EXAM  VITAL SIGNS: /97   Pulse 100   Temp 36.7 °C (98 °F) (Temporal)   Resp 20   Ht 1.702 m (5' 7\")   Wt 91 kg (200 lb 9.9 oz)   LMP 07/04/2021 (Approximate)   SpO2 95%   BMI 31.42 kg/m²  "      Constitutional: Well developed, Well nourished, No acute distress, Non-toxic appearance.   HENT: Normocephalic, Atraumatic, Bilateral external ears normal, Oropharynx moist, No oral exudates, Nose normal.   Eyes: PERRLA, EOMI, Conjunctiva normal, No discharge.   Neck: Normal range of motion, No tenderness, Supple, No stridor.   Cardiovascular: Normal heart rate, Normal rhythm, No murmurs, No rubs, No gallops.   Thorax & Lungs: Normal breath sounds, No respiratory distress, No wheezing, No chest tenderness.   Abdomen: Bowel sounds normal, Soft, No tenderness, No masses, No pulsatile masses.   Skin: Warm, Dry, No erythema, No rash.   Extremities: Intact distal pulses, No edema, No tenderness, No cyanosis, No clubbing.   Neurologic: Alert & oriented x 3, Normal motor function, Normal sensory function, No focal deficits noted.   Psychiatric: Anxious    CT-MAXILLOFACIAL WITH PLUS RECONS   Final Result      1.  Asymmetric right soft tissue swelling and enhancement of the right nasal soft tissues within the nasal cavity adjacent to the mesial aspect of the right nasal bone. This also extends into the inferior nasal turbinate. This likely present cellulitis.    There is no adjacent bony destructive change. There is no evidence of a defined abscess at this time.      2.  Right-sided prenasal, premaxillary and premandibular soft tissue swelling/cellulitis.      3.  Dental caries and periodontal disease.        Results for orders placed or performed during the hospital encounter of 07/18/21   CBC WITH DIFFERENTIAL   Result Value Ref Range    WBC 8.6 4.8 - 10.8 K/uL    RBC 4.63 4.20 - 5.40 M/uL    Hemoglobin 14.1 12.0 - 16.0 g/dL    Hematocrit 42.4 37.0 - 47.0 %    MCV 91.6 81.4 - 97.8 fL    MCH 30.5 27.0 - 33.0 pg    MCHC 33.3 (L) 33.6 - 35.0 g/dL    RDW 39.6 35.9 - 50.0 fL    Platelet Count 238 164 - 446 K/uL    MPV 9.6 9.0 - 12.9 fL    Neutrophils-Polys 84.50 (H) 44.00 - 72.00 %    Lymphocytes 8.40 (L) 22.00 - 41.00 %     Monocytes 4.90 0.00 - 13.40 %    Eosinophils 1.30 0.00 - 6.90 %    Basophils 0.40 0.00 - 1.80 %    Immature Granulocytes 0.50 0.00 - 0.90 %    Nucleated RBC 0.00 /100 WBC    Neutrophils (Absolute) 7.24 (H) 2.00 - 7.15 K/uL    Lymphs (Absolute) 0.72 (L) 1.00 - 4.80 K/uL    Monos (Absolute) 0.42 0.00 - 0.85 K/uL    Eos (Absolute) 0.11 0.00 - 0.51 K/uL    Baso (Absolute) 0.03 0.00 - 0.12 K/uL    Immature Granulocytes (abs) 0.04 0.00 - 0.11 K/uL    NRBC (Absolute) 0.00 K/uL   Comp Metabolic Panel   Result Value Ref Range    Sodium 136 135 - 145 mmol/L    Potassium 3.7 3.6 - 5.5 mmol/L    Chloride 102 96 - 112 mmol/L    Co2 25 20 - 33 mmol/L    Anion Gap 9.0 7.0 - 16.0    Glucose 110 (H) 65 - 99 mg/dL    Bun 6 (L) 8 - 22 mg/dL    Creatinine 0.95 0.50 - 1.40 mg/dL    Calcium 9.1 8.4 - 10.2 mg/dL    AST(SGOT) 10 (L) 12 - 45 U/L    ALT(SGPT) 8 2 - 50 U/L    Alkaline Phosphatase 68 30 - 99 U/L    Total Bilirubin 0.6 0.1 - 1.5 mg/dL    Albumin 3.9 3.2 - 4.9 g/dL    Total Protein 7.0 6.0 - 8.2 g/dL    Globulin 3.1 1.9 - 3.5 g/dL    A-G Ratio 1.3 g/dL   HCG QUAL SERUM   Result Value Ref Range    Beta-Hcg Qualitative Serum Negative Negative   ESTIMATED GFR   Result Value Ref Range    GFR If African American >60 >60 mL/min/1.73 m 2    GFR If Non African American >60 >60 mL/min/1.73 m 2        COURSE & MEDICAL DECISION MAKING  Pertinent Labs & Imaging studies reviewed. (See chart for details)  An IV was established.  The patient was given some Decadron and Unasyn.  Her laboratory studies are reassuring including CBC and metabolic panel.  She has early dental infection without abscess.  I will continue her on Augmentin and she has an appointment with her dentist tomorrow    FINAL IMPRESSION  1.  Dental infection      Electronically signed by: Zeke Goss M.D., 7/18/2021 11:25 AM

## 2024-04-14 ENCOUNTER — OFFICE VISIT (OUTPATIENT)
Dept: URGENT CARE | Facility: CLINIC | Age: 44
End: 2024-04-14
Payer: COMMERCIAL

## 2024-04-14 VITALS
BODY MASS INDEX: 29.66 KG/M2 | RESPIRATION RATE: 18 BRPM | HEIGHT: 67 IN | WEIGHT: 189 LBS | TEMPERATURE: 97.8 F | DIASTOLIC BLOOD PRESSURE: 82 MMHG | HEART RATE: 91 BPM | OXYGEN SATURATION: 97 % | SYSTOLIC BLOOD PRESSURE: 128 MMHG

## 2024-04-14 DIAGNOSIS — R05.1 ACUTE COUGH: ICD-10-CM

## 2024-04-14 DIAGNOSIS — J02.9 SORE THROAT: ICD-10-CM

## 2024-04-14 DIAGNOSIS — Z20.818 EXPOSURE TO STREPTOCOCCAL PHARYNGITIS: ICD-10-CM

## 2024-04-14 LAB
FLUAV RNA SPEC QL NAA+PROBE: NEGATIVE
FLUBV RNA SPEC QL NAA+PROBE: NEGATIVE
RSV RNA SPEC QL NAA+PROBE: NEGATIVE
S PYO DNA SPEC NAA+PROBE: NOT DETECTED
SARS-COV-2 RNA RESP QL NAA+PROBE: NEGATIVE

## 2024-04-14 PROCEDURE — 3074F SYST BP LT 130 MM HG: CPT | Performed by: PHYSICIAN ASSISTANT

## 2024-04-14 PROCEDURE — 87651 STREP A DNA AMP PROBE: CPT | Performed by: PHYSICIAN ASSISTANT

## 2024-04-14 PROCEDURE — 3079F DIAST BP 80-89 MM HG: CPT | Performed by: PHYSICIAN ASSISTANT

## 2024-04-14 PROCEDURE — 99213 OFFICE O/P EST LOW 20 MIN: CPT | Performed by: PHYSICIAN ASSISTANT

## 2024-04-14 PROCEDURE — 0241U POCT CEPHEID COV-2, FLU A/B, RSV - PCR: CPT | Performed by: PHYSICIAN ASSISTANT

## 2024-04-14 RX ORDER — AMOXICILLIN 500 MG/1
500 CAPSULE ORAL 2 TIMES DAILY
Qty: 20 CAPSULE | Refills: 0 | Status: SHIPPED | OUTPATIENT
Start: 2024-04-14 | End: 2024-04-24

## 2024-04-14 NOTE — PROGRESS NOTES
"Subjective:   Edna Cunha is a 44 y.o. female who presents for Pharyngitis (X1 day/cough/chills)     Mild ST started yesterday, concerned as son was diagnosed with strep pharyngitis 3 days ago  Some cough, minimal  Chills, subjective fever  Uncomfortable swallow but able to tolerate solids and liquids  Some fatigue and run down  No history of underlying respiratory illnesses  No known COVID concerns      Medications:  Albuterol Sulfate Aepb  cetirizine Tabs  MULTIVITAMIN ADULTS PO  sertraline Tabs    Allergies:             Patient has no known allergies.    Surgical History:         Past Surgical History:   Procedure Laterality Date    GANGLION EXCISION Left 8/27/2020    Procedure: EXCISION, GANGLION CYST- WRIST;  Surgeon: Yelena Nicole M.D.;  Location: SURGERY SAME DAY [facility];  Service: Orthopedics    JOINT INJECTION DIAGNOSTIC Left 8/27/2020    Procedure: INJECTION, JOINT, DIAGNOSTIC- CORTISONE IN LEFT FIRST DORSAL COMPARTMENT;  Surgeon: Yelena Nicole M.D.;  Location: SURGERY SAME DAY [facility];  Service: Orthopedics    PECTUS EXCAVATUM         Past Social Hx:  Edna Cunha  reports that she has never smoked. She has never used smokeless tobacco. She reports that she does not currently use alcohol. She reports current drug use. Drugs: Inhaled and Oral.     Past Family Hx:   Edna Cunha family history is not on file.       Problem list, medications, and allergies reviewed by myself today in Epic.     Objective:     /82   Pulse 91   Temp 36.6 °C (97.8 °F) (Temporal)   Resp 18   Ht 1.702 m (5' 7\")   Wt 85.7 kg (189 lb)   SpO2 97%   BMI 29.60 kg/m²     Physical Exam  Vitals and nursing note reviewed.   Constitutional:       General: She is not in acute distress.     Appearance: Normal appearance. She is not ill-appearing or toxic-appearing.   HENT:      Head: Normocephalic.      Jaw: No trismus.      Right Ear: External ear normal. No drainage. A middle ear " effusion is present. No mastoid tenderness. Tympanic membrane is not erythematous or bulging.      Left Ear: External ear normal. No drainage. A middle ear effusion is present. No mastoid tenderness. Tympanic membrane is not erythematous or bulging.      Nose: Congestion and rhinorrhea present.      Right Turbinates: Enlarged and swollen.      Left Turbinates: Enlarged and swollen.      Mouth/Throat:      Mouth: Mucous membranes are moist.      Tongue: No lesions. Tongue does not deviate from midline.      Palate: No lesions.      Pharynx: Uvula midline. Posterior oropharyngeal erythema present. No oropharyngeal exudate or uvula swelling.      Tonsils: No tonsillar exudate or tonsillar abscesses.   Eyes:      General:         Right eye: No discharge.         Left eye: No discharge.      Extraocular Movements: Extraocular movements intact.   Cardiovascular:      Rate and Rhythm: Normal rate and regular rhythm.      Pulses: Normal pulses.      Heart sounds: Normal heart sounds. No murmur heard.  Pulmonary:      Effort: Pulmonary effort is normal. No accessory muscle usage or respiratory distress.      Breath sounds: Normal breath sounds and air entry. No stridor or decreased air movement. No wheezing, rhonchi or rales.      Comments: Lungs CTA b/l  Musculoskeletal:      Cervical back: Normal range of motion. No rigidity.   Lymphadenopathy:      Head:      Right side of head: No tonsillar adenopathy.      Left side of head: No tonsillar adenopathy.      Cervical: No cervical adenopathy.   Skin:     General: Skin is warm.      Findings: No rash.   Neurological:      Mental Status: She is alert.   Psychiatric:         Behavior: Behavior is cooperative.       Results for orders placed or performed in visit on 04/14/24   POCT CEPHEID GROUP A STREP - PCR   Result Value Ref Range    POC Group A Strep, PCR Not Detected Not Detected, Invalid   POCT CEPHEID COV-2, FLU A/B, RSV - PCR   Result Value Ref Range    SARS-CoV-2 by PCR  Negative Negative, Invalid    Influenza virus A RNA Negative Negative, Invalid    Influenza virus B, PCR Negative Negative, Invalid    RSV, PCR Negative Negative, Invalid             Assessment/Plan:     Diagnosis and Associated Orders:     1. Exposure to Streptococcal pharyngitis  - POCT CEPHEID GROUP A STREP - PCR  - amoxicillin (AMOXIL) 500 MG Cap; Take 1 Capsule by mouth 2 times a day for 10 days.  Dispense: 20 Capsule; Refill: 0    2. Acute cough  - POCT CEPHEID COV-2, FLU A/B, RSV - PCR    3. Sore throat        Comments/MDM:  Strep and viral PCR negative.  Patient's son was diagnosed with strep pharyngitis.  Continue antibiotic prescription provided if symptoms do worsen including increased sore throat, fever, chills, dysphagia.  Solar gargles, Tylenol/ibuprofen as needed for pain.  Soft foods, cool liquids.  Return precautions discussed.    The patient's presenting symptoms and exam findings most likely are due to a viral etiology.  Vital signs stable and reassuring.  Lungs are clear to auscultation bilaterally.  Patient is not hypoxic.  They are afebrile.  They are nontachypneic.    Symptomatic and supportive care:   Plenty of oral hydration and rest   Over the counter cough suppressant as directed.  Tylenol or ibuprofen for pain and fever as directed.   Warm salt water gargles for sore throat, soft foods, cool liquids.   Saline nasal spray, Flonase, and/or otc sudafed (if no history of hypertension) as a decongestant.   Infection control measures at home. Stay away from people, Hand washing, covering sneeze/cough, disinfect surfaces.   Remain home from work, school, and other populated environments while ill.  Overall, the patient is well-appearing. They are not hypoxic, afebrile, and a normal pulmonary exam.      Patient should to proceed to ED for development of symptoms including but not limited to shortness of breath breath, respiratory distress, increased fever, persistent symptoms, or worsening  symptoms not manageable at home.      I personally reviewed prior external notes and test results pertinent to today's visit. Supportive care, natural history, differential diagnoses, and indications for immediate follow-up discussed. Return to clinic or go to ED if symptoms worsen or persist.  Red flag symptoms discussed.  Patient/Parent/Guardian voices understanding. Follow-up with your primary care provider in 3-5 days.  All side effects of medication discussed including allergic response, GI upset, tendon injury, rash, sedation etc    Please note that this dictation was created using voice recognition software. I have made a reasonable attempt to correct obvious errors, but I expect that there are errors of grammar and possibly content that I did not discover before finalizing the note.    This note was electronically signed by Jeannette Mejia PA-C

## 2024-04-25 ENCOUNTER — HOSPITAL ENCOUNTER (OUTPATIENT)
Facility: MEDICAL CENTER | Age: 44
End: 2024-04-25
Attending: PHYSICIAN ASSISTANT
Payer: COMMERCIAL

## 2024-04-25 ENCOUNTER — OFFICE VISIT (OUTPATIENT)
Dept: URGENT CARE | Facility: CLINIC | Age: 44
End: 2024-04-25
Payer: COMMERCIAL

## 2024-04-25 VITALS
HEIGHT: 68 IN | BODY MASS INDEX: 28.93 KG/M2 | WEIGHT: 190.9 LBS | OXYGEN SATURATION: 96 % | TEMPERATURE: 97.7 F | HEART RATE: 97 BPM | SYSTOLIC BLOOD PRESSURE: 130 MMHG | DIASTOLIC BLOOD PRESSURE: 72 MMHG | RESPIRATION RATE: 18 BRPM

## 2024-04-25 DIAGNOSIS — J45.20 MILD INTERMITTENT ASTHMA WITHOUT COMPLICATION: ICD-10-CM

## 2024-04-25 DIAGNOSIS — J06.0 SORE THROAT AND LARYNGITIS: ICD-10-CM

## 2024-04-25 PROCEDURE — 3078F DIAST BP <80 MM HG: CPT | Performed by: PHYSICIAN ASSISTANT

## 2024-04-25 PROCEDURE — 99214 OFFICE O/P EST MOD 30 MIN: CPT | Performed by: PHYSICIAN ASSISTANT

## 2024-04-25 PROCEDURE — 87070 CULTURE OTHR SPECIMN AEROBIC: CPT

## 2024-04-25 PROCEDURE — 3075F SYST BP GE 130 - 139MM HG: CPT | Performed by: PHYSICIAN ASSISTANT

## 2024-04-25 RX ORDER — METHYLPREDNISOLONE 4 MG/1
TABLET ORAL
Qty: 21 TABLET | Refills: 0 | Status: SHIPPED | OUTPATIENT
Start: 2024-04-25

## 2024-04-25 ASSESSMENT — ENCOUNTER SYMPTOMS
EYE REDNESS: 0
VOMITING: 0
FEVER: 0
EYE DISCHARGE: 0
RHINORRHEA: 0
COUGH: 1
DIARRHEA: 0
CHILLS: 0
SHORTNESS OF BREATH: 0
NAUSEA: 0
WHEEZING: 0
CARDIOVASCULAR NEGATIVE: 1
MYALGIAS: 0
SORE THROAT: 1
ABDOMINAL PAIN: 0

## 2024-04-25 NOTE — PROGRESS NOTES
Subjective     Edna Cunha is a very pleasant 44 y.o. female who presents with Follow-Up (X 4/14/2024 was here and tested neg for strep/ took amoxicillin and finished antibiotics/ symptoms are worsening/ concerned of Haemophilus infection.)            Patient treated for strep on 4/14/2024 with amoxicillin.  She does report improvement in her symptoms but they have returned.  She has had multiple sick kids at home.    Cough  This is a new problem. The current episode started 1 to 4 weeks ago. The problem has been gradually worsening. The problem occurs every few minutes. The cough is Productive of sputum. Associated symptoms include a sore throat. Pertinent negatives include no chills, ear pain, eye redness, fever, myalgias, nasal congestion, postnasal drip, rhinorrhea, shortness of breath or wheezing. She has tried OTC cough suppressant for the symptoms. The treatment provided mild relief. Her past medical history is significant for environmental allergies.       PMH:  has a past medical history of Allergy, Asthma, Heart burn, Psychiatric problem, and Sjogren's disease (HCC).  MEDS: No current outpatient medications on file.  ALLERGIES: No Known Allergies  SURGHX:   Past Surgical History:   Procedure Laterality Date    GANGLION EXCISION Left 8/27/2020    Procedure: EXCISION, GANGLION CYST- WRIST;  Surgeon: Yelena Nicole M.D.;  Location: SURGERY SAME DAY [facility];  Service: Orthopedics    JOINT INJECTION DIAGNOSTIC Left 8/27/2020    Procedure: INJECTION, JOINT, DIAGNOSTIC- CORTISONE IN LEFT FIRST DORSAL COMPARTMENT;  Surgeon: Yelena Nicole M.D.;  Location: SURGERY SAME DAY [facility];  Service: Orthopedics    PECTUS EXCAVATUM       SOCHX:  reports that she has never smoked. She has never used smokeless tobacco. She reports that she does not currently use alcohol. She reports current drug use. Drugs: Inhaled and Oral.  FH: family history is not on file.        Review of Systems   Constitutional:   "Negative for chills and fever.   HENT:  Positive for congestion and sore throat. Negative for ear pain, postnasal drip and rhinorrhea.    Eyes:  Negative for discharge and redness.   Respiratory:  Positive for cough. Negative for shortness of breath and wheezing.    Cardiovascular: Negative.    Gastrointestinal:  Negative for abdominal pain, diarrhea, nausea and vomiting.   Musculoskeletal:  Negative for myalgias.   Endo/Heme/Allergies:  Positive for environmental allergies.       Medications, Allergies, and current problem list reviewed today in Epic           Objective     /72 (BP Location: Right arm, Patient Position: Sitting, BP Cuff Size: Adult)   Pulse 97   Temp 36.5 °C (97.7 °F) (Temporal)   Resp 18   Ht 1.727 m (5' 8\")   Wt 86.6 kg (190 lb 14.4 oz)   SpO2 96%   BMI 29.03 kg/m²      Physical Exam  Vitals and nursing note reviewed.   Constitutional:       General: She is not in acute distress.     Appearance: Normal appearance. She is well-developed. She is not ill-appearing, toxic-appearing or diaphoretic.   HENT:      Head: Normocephalic and atraumatic.      Right Ear: Tympanic membrane, ear canal and external ear normal.      Left Ear: Tympanic membrane, ear canal and external ear normal.      Nose: Nose normal. No congestion or rhinorrhea.      Mouth/Throat:      Mouth: Mucous membranes are moist.      Pharynx: Uvula midline. Posterior oropharyngeal erythema present. No pharyngeal swelling, oropharyngeal exudate or uvula swelling.      Tonsils: No tonsillar exudate.   Eyes:      General:         Right eye: No discharge.         Left eye: No discharge.      Conjunctiva/sclera: Conjunctivae normal.   Cardiovascular:      Rate and Rhythm: Normal rate and regular rhythm.      Pulses: Normal pulses.      Heart sounds: Normal heart sounds.   Pulmonary:      Effort: Pulmonary effort is normal. No respiratory distress.      Breath sounds: Normal breath sounds. No stridor. No wheezing, rhonchi or " rales.   Musculoskeletal:      Cervical back: Normal range of motion and neck supple.      Right lower leg: No edema.      Left lower leg: No edema.   Lymphadenopathy:      Cervical: No cervical adenopathy.   Skin:     General: Skin is warm and dry.   Neurological:      General: No focal deficit present.      Mental Status: She is alert and oriented to person, place, and time. Mental status is at baseline.   Psychiatric:         Mood and Affect: Mood normal.         Behavior: Behavior normal.         Thought Content: Thought content normal.         Judgment: Judgment normal.                             Assessment & Plan     This is a very pleasant 44-year-old female presenting with ongoing sore throat, cough and congestion for the past few weeks.  Was seen on 4/14/2024 and treated empirically for strep throat with amoxicillin.  At that point her child had tested positive for strep.  She does report improvement after finishing the antibiotics but the symptoms have since returned.  She denies fever, chills, body aches.  She denies shortness of breath, chest pain, wheezing, leg swelling.  Eating and drinking without vomiting or diarrhea.  Patient with history of H. influenzae and is concerned of the same.  History of allergic rhinitis and asthma.  Vital signs are normal.  Posterior pharynx erythema noted but no tonsillar enlargement, exudate or uvular involvement.  No regional adenopathy.  Lungs are clear without wheezing, rhonchi, rales or stridor.  Remainder of exam benign/reassuring.  We will complete throat culture.  We will withhold antibiotics at this time until throat culture finalizes.  Symptomatic relief provided.      1. Sore throat and laryngitis  CULTURE THROAT    methylPREDNISolone (MEDROL DOSEPAK) 4 MG Tablet Therapy Pack      2. Mild intermittent asthma without complication  methylPREDNISolone (MEDROL DOSEPAK) 4 MG Tablet Therapy Pack          I personally reviewed prior external notes and test results  pertinent to today's visit. Return to clinic or go to ED if symptoms worsen or persist. Red flag symptoms and indications for ED discussed at length. Patient/Parent/Guardian voices understanding.  AVS with post-visit instructions printed and provided or given verbally.  Follow-up with your primary care provider in 3-5 days. All side effects and potential interactions of prescribed medication discussed including allergic response, GI upset, tendon injury, rash, sedation, OCP effectiveness, etc.    Please note that this dictation was created using voice recognition software. I have made every reasonable attempt to correct obvious errors, but I expect that there are errors of grammar and possibly content that I did not discover before finalizing the note.

## 2024-04-27 LAB
BACTERIA SPEC RESP CULT: NORMAL
SIGNIFICANT IND 70042: NORMAL
SITE SITE: NORMAL
SOURCE SOURCE: NORMAL

## 2024-12-31 ENCOUNTER — APPOINTMENT (OUTPATIENT)
Dept: RADIOLOGY | Facility: IMAGING CENTER | Age: 44
End: 2024-12-31
Attending: NURSE PRACTITIONER
Payer: COMMERCIAL

## 2024-12-31 ENCOUNTER — OFFICE VISIT (OUTPATIENT)
Dept: URGENT CARE | Facility: CLINIC | Age: 44
End: 2024-12-31
Payer: COMMERCIAL

## 2024-12-31 VITALS
HEART RATE: 105 BPM | OXYGEN SATURATION: 95 % | RESPIRATION RATE: 8 BRPM | HEIGHT: 68 IN | BODY MASS INDEX: 30.77 KG/M2 | DIASTOLIC BLOOD PRESSURE: 74 MMHG | WEIGHT: 203 LBS | SYSTOLIC BLOOD PRESSURE: 122 MMHG | TEMPERATURE: 97.5 F

## 2024-12-31 DIAGNOSIS — R06.02 SOB (SHORTNESS OF BREATH): ICD-10-CM

## 2024-12-31 DIAGNOSIS — J40 BRONCHITIS: ICD-10-CM

## 2024-12-31 DIAGNOSIS — R05.1 ACUTE COUGH: ICD-10-CM

## 2024-12-31 DIAGNOSIS — J45.901 ASTHMA WITH ACUTE EXACERBATION, UNSPECIFIED ASTHMA SEVERITY, UNSPECIFIED WHETHER PERSISTENT: ICD-10-CM

## 2024-12-31 PROCEDURE — 71046 X-RAY EXAM CHEST 2 VIEWS: CPT | Mod: TC | Performed by: RADIOLOGY

## 2024-12-31 RX ORDER — IPRATROPIUM BROMIDE AND ALBUTEROL SULFATE 2.5; .5 MG/3ML; MG/3ML
3 SOLUTION RESPIRATORY (INHALATION) ONCE
Status: DISCONTINUED | OUTPATIENT
Start: 2024-12-31 | End: 2024-12-31

## 2024-12-31 RX ORDER — AZITHROMYCIN 250 MG/1
TABLET, FILM COATED ORAL
Qty: 6 TABLET | Refills: 0 | Status: SHIPPED | OUTPATIENT
Start: 2024-12-31 | End: 2025-01-05

## 2024-12-31 RX ORDER — PREDNISONE 20 MG/1
40 TABLET ORAL DAILY
Qty: 10 TABLET | Refills: 0 | Status: SHIPPED | OUTPATIENT
Start: 2024-12-31 | End: 2025-01-05

## 2024-12-31 RX ORDER — ALBUTEROL SULFATE 90 UG/1
1-2 INHALANT RESPIRATORY (INHALATION) EVERY 4 HOURS PRN
Qty: 8 G | Refills: 0 | Status: SHIPPED | OUTPATIENT
Start: 2024-12-31

## 2024-12-31 RX ORDER — IPRATROPIUM BROMIDE AND ALBUTEROL SULFATE 2.5; .5 MG/3ML; MG/3ML
3 SOLUTION RESPIRATORY (INHALATION) ONCE
Status: COMPLETED | OUTPATIENT
Start: 2024-12-31 | End: 2024-12-31

## 2024-12-31 RX ADMIN — IPRATROPIUM BROMIDE AND ALBUTEROL SULFATE 3 ML: 2.5; .5 SOLUTION RESPIRATORY (INHALATION) at 12:18

## 2024-12-31 ASSESSMENT — ENCOUNTER SYMPTOMS
SHORTNESS OF BREATH: 1
CHILLS: 1
SPUTUM PRODUCTION: 1
SORE THROAT: 1
WHEEZING: 1
HEMOPTYSIS: 1
FEVER: 0
COUGH: 1

## 2024-12-31 ASSESSMENT — VISUAL ACUITY: OU: 1

## 2024-12-31 NOTE — PROGRESS NOTES
"Subjective:     Edna Cunha is a 44 y.o. female who presents for Cough (SX started 1 week ago, cough, congestion, runny nose, fatigue, coughing up \"streaks of blood\" )       Cough  This is a new problem. The problem has been gradually worsening. Associated symptoms include chills, hemoptysis, a sore throat, shortness of breath and wheezing. Pertinent negatives include no fever.     Ambient listening software ([employer]) used during this encounter with the consent of the patient. The following text is AI generated:    History of Present Illness  The patient presents for evaluation of a cough.    Cough and Associated Symptoms  She reports shortness of breath since  and a persistent cough since [place] Day, with congestion, rhinorrhea, and throat irritation attributed to postnasal drip. She also has mild dysphagia due to postnasal drainage. She has a history of asthma and has been using Mucinex intermittently and her inhaler sporadically, including this morning, which helped with expectoration but caused jitteriness. She does not own a nebulizer and has an  albuterol inhaler at home, requesting a refill. She has experienced chills but no fever, consistent with her history of autoimmune disorders. She declines COVID-19 testing.    Her throat feels raw from coughing, and she had an episode last night of expectorating phlegm with blood streaks, uncertain if it originated from her lungs or was due to coughing. She cleared her sinuses to rule out bloody nasal discharge, but no blood was observed.  - Onset: Shortness of breath since ; persistent cough since [place] Day.  - Location: Throat irritation, congestion, rhinorrhea.  - Duration: Persistent since [place] Day.  - Character: Cough with congestion, rhinorrhea, throat irritation, mild dysphagia, and postnasal drip.  - Alleviating/Aggravating Factors: Mucinex and inhaler use helped with expectoration but caused " jitteriness; no nebulizer;  albuterol inhaler.  - Radiation: Not specified.  - Timing: Intermittent use of Mucinex and sporadic use of inhaler; episode of expectorating phlegm with blood streaks last night.  - Severity: Throat feels raw from coughing; chills but no fever; uncertain if blood streaks in phlegm originated from lungs or due to coughing.    MEDICATIONS  - Current: Mucinex  - Current: Albuterol    Review of Systems   Constitutional:  Positive for chills. Negative for fever.   HENT:  Positive for congestion and sore throat.    Respiratory:  Positive for cough, hemoptysis, sputum production, shortness of breath and wheezing.    All other systems reviewed and are negative.    Refer to [facility] for additional details.    During this visit, appropriate PPE was worn, and hand hygiene was performed.    PMH:  has a past medical history of Allergy, Asthma, Heart burn, Psychiatric problem, and Sjogren's disease (HCC).    MEDS:   Current Outpatient Medications:     predniSONE (DELTASONE) 20 MG Tab, Take 2 Tablets by mouth every day for 5 days. Do not take with NSAIDs such as ibuprofen., Disp: 10 Tablet, Rfl: 0    albuterol 108 (90 Base) MCG/ACT Aero Soln inhalation aerosol, Inhale 1-2 Puffs every four hours as needed for Shortness of Breath (and/or wheezing)., Disp: 8 g, Rfl: 0    azithromycin (ZITHROMAX) 250 MG Tab, Take 2 tabs by mouth once today, then one tab by mouth once daily days 2-5., Disp: 6 Tablet, Rfl: 0    ALLERGIES: No Known Allergies  SURGHX:   Past Surgical History:   Procedure Laterality Date    GANGLION EXCISION Left 2020    Procedure: EXCISION, GANGLION CYST- WRIST;  Surgeon: Yelena Nicole M.D.;  Location: SURGERY SAME DAY [facility];  Service: Orthopedics    JOINT INJECTION DIAGNOSTIC Left 2020    Procedure: INJECTION, JOINT, DIAGNOSTIC- CORTISONE IN LEFT FIRST DORSAL COMPARTMENT;  Surgeon: Yelena Nicole M.D.;  Location: SURGERY SAME DAY [facility];  Service: Orthopedics     "PECTUS EXCAVATUM       SOCHX:  reports that she has never smoked. She has never used smokeless tobacco. She reports that she does not currently use alcohol. She reports current drug use. Drugs: Inhaled and Oral.    FH: Per HPI as applicable/pertinent.      Objective:     /74   Pulse (!) 105   Temp 36.4 °C (97.5 °F) (Temporal)   Resp (!) 8   Ht 1.727 m (5' 8\")   Wt 92.1 kg (203 lb)   SpO2 95%   BMI 30.87 kg/m²     Physical Exam  Nursing note reviewed.   Constitutional:       General: She is not in acute distress.     Appearance: She is well-developed. She is not toxic-appearing.   HENT:      Head: Normocephalic.      Nose: Nose normal.      Mouth/Throat:      Mouth: Mucous membranes are moist.      Pharynx: Oropharynx is clear.   Eyes:      General: Vision grossly intact.      Extraocular Movements: Extraocular movements intact.      Conjunctiva/sclera: Conjunctivae normal.   Cardiovascular:      Rate and Rhythm: Regular rhythm. Tachycardia present.      Heart sounds: Normal heart sounds.   Pulmonary:      Effort: Pulmonary effort is normal. No respiratory distress.      Breath sounds: Wheezing present. No decreased breath sounds.   Musculoskeletal:         General: No deformity. Normal range of motion.      Cervical back: Normal range of motion.   Skin:     General: Skin is warm and dry.      Coloration: Skin is not pale.   Neurological:      Mental Status: She is alert and oriented to person, place, and time.      Motor: No weakness.   Psychiatric:         Behavior: Behavior normal. Behavior is cooperative.     Chest x-ray:    Details    Reading Physician Reading Date Result Priority   Erich Harris M.D.  [phone] 12/31/2024      Narrative & Impression     12/31/2024 11:44 AM     HISTORY/REASON FOR EXAM:  Cough.     TECHNIQUE/EXAM DESCRIPTION AND NUMBER OF VIEWS:  Two views of the chest.     COMPARISON:  Chest radiography, 2/16/2021.     FINDINGS:  Cardiomediastinal silhouette size is " within normal limits.  No pulmonary infiltrates or consolidations are noted.  No pleural abnormalities are noted.     IMPRESSION:     No acute cardiac or pulmonary abnormalities are identified.        Exam Ended: 12/31/24 11:49 AM Last Resulted: 12/31/24 11:55 AM     Radiology report and images reviewed by myself. Concur with findings.      Assessment/Plan:     1. Acute cough  - DX-CHEST-2 VIEWS; Future    2. SOB (shortness of breath)  - ipratropium-albuterol (DUONEB) nebulizer solution    3. Bronchitis  - predniSONE (DELTASONE) 20 MG Tab; Take 2 Tablets by mouth every day for 5 days. Do not take with NSAIDs such as ibuprofen.  Dispense: 10 Tablet; Refill: 0  - albuterol 108 (90 Base) MCG/ACT Aero Soln inhalation aerosol; Inhale 1-2 Puffs every four hours as needed for Shortness of Breath (and/or wheezing).  Dispense: 8 g; Refill: 0  - azithromycin (ZITHROMAX) 250 MG Tab; Take 2 tabs by mouth once today, then one tab by mouth once daily days 2-5.  Dispense: 6 Tablet; Refill: 0    4. Asthma with acute exacerbation, unspecified asthma severity, unspecified whether persistent    Patient reports improvement in symptoms after breathing treatment.  SpO2 95 to 96%.    Emphasize supportive measures rest, fluids, and symptom management with over-the-counter medication as needed such as Mucinex.  Rx as above sent electronically.    Monitor. Warning signs reviewed. Return precautions advised.     Differential diagnosis, natural history, supportive care, over-the-counter symptom management per 's instructions, close monitoring, and indications for immediate follow-up discussed.     All questions answered. Patient agrees with the plan of care.    Discharge summary provided via [employer].    Billing note: Chronic problem with exacerbation, moderate complexity (independent interpretation) and moderate risk (Rx management). Established patient. [id number]. Please refer to LOS tool for details.

## (undated) DEVICE — KIT ANESTHESIA W/CIRCUIT & 3/LT BAG W/FILTER (20EA/CA)

## (undated) DEVICE — TUBING CLEARLINK DUO-VENT - C-FLO (48EA/CA)

## (undated) DEVICE — MASK AIRWAY FLEXIBLE SINGLE-USE SIZE 3 CHILDREN (10EA/BX)

## (undated) DEVICE — HEAD HOLDER JUNIOR/ADULT

## (undated) DEVICE — SUTURE 4-0 ETHILON FS-2 18 (36PK/BX)"

## (undated) DEVICE — PROTECTOR ULNA NERVE - (36PR/CA)

## (undated) DEVICE — SENSOR SPO2 NEO LNCS ADHESIVE (20/BX) SEE USER NOTES

## (undated) DEVICE — BANDAID SHEER STRIP 3/4 IN (100EA/BX 12BX/CA)

## (undated) DEVICE — KIT  I.V. START (100EA/CA)

## (undated) DEVICE — GLOVE BIOGEL SZ 7 SURGICAL PF LTX - (50PR/BX 4BX/CA)

## (undated) DEVICE — CANISTER SUCTION 3000ML MECHANICAL FILTER AUTO SHUTOFF MEDI-VAC NONSTERILE LF DISP  (40EA/CA)

## (undated) DEVICE — SODIUM CHL IRRIGATION 0.9% 1000ML (12EA/CA)

## (undated) DEVICE — SPONGE GAUZESTER 4 X 4 4PLY - (128PK/CA)

## (undated) DEVICE — TUBE CONNECTING SUCTION - CLEAR PLASTIC STERILE 72 IN (50EA/CA)

## (undated) DEVICE — SET LEADWIRE 5 LEAD BEDSIDE DISPOSABLE ECG (1SET OF 5/EA)

## (undated) DEVICE — CATHETER IV 20 GA X 1-1/4 ---SURG.& SDS ONLY--- (50EA/BX)

## (undated) DEVICE — SUTURE GENERAL

## (undated) DEVICE — WATER IRRIGATION STERILE 1000ML (12EA/CA)

## (undated) DEVICE — NEEDLE SPINAL NON-SAFETY 20 GA X 3 IN (25/BX)

## (undated) DEVICE — SET EXTENSION WITH 2 PORTS (48EA/CA) ***PART #2C8610 IS A SUBSTITUTE*****

## (undated) DEVICE — CANISTER SUCTION RIGID RED 1500CC (40EA/CA)

## (undated) DEVICE — SYRINGE 10 ML CONTROL LL (25EA/BX 4BX/CA)

## (undated) DEVICE — SUCTION INSTRUMENT YANKAUER BULBOUS TIP W/O VENT (50EA/CA)

## (undated) DEVICE — GOWN WARMING STANDARD FLEX - (30/CA)

## (undated) DEVICE — PACK UPPER EXTREMITY (2EA/CA)

## (undated) DEVICE — MASK ANESTHESIA ADULT  - (100/CA)

## (undated) DEVICE — LACTATED RINGERS INJ 1000 ML - (14EA/CA 60CA/PF)

## (undated) DEVICE — BANDAGE ELASTIC 3 X 5 YDS - STERILE VELCRO (25/CA)LATEX

## (undated) DEVICE — GLOVE BIOGEL SZ 8 SURGICAL PF LTX - (50PR/BX 4BX/CA)

## (undated) DEVICE — GLOVE BIOGEL PI INDICATOR SZ 6.5 SURGICAL PF LF - (50/BX 4BX/CA)

## (undated) DEVICE — TOURNIQUET, STERILE 18 (RED)

## (undated) DEVICE — GLOVE BIOGEL SZ 6 PF LATEX - (50EA/BX 4BX/CA)

## (undated) DEVICE — CHLORAPREP 26 ML APPLICATOR - ORANGE TINT(25/CA)

## (undated) DEVICE — ELECTRODE 850 FOAM ADHESIVE - HYDROGEL RADIOTRNSPRNT (50/PK)